# Patient Record
Sex: FEMALE | Race: ASIAN | NOT HISPANIC OR LATINO | Employment: FULL TIME | URBAN - METROPOLITAN AREA
[De-identification: names, ages, dates, MRNs, and addresses within clinical notes are randomized per-mention and may not be internally consistent; named-entity substitution may affect disease eponyms.]

---

## 2017-01-03 ENCOUNTER — RADIANT APPOINTMENT (OUTPATIENT)
Dept: GENERAL RADIOLOGY | Facility: CLINIC | Age: 57
End: 2017-01-03
Attending: INTERNAL MEDICINE
Payer: COMMERCIAL

## 2017-01-03 ENCOUNTER — OFFICE VISIT (OUTPATIENT)
Dept: INTERNAL MEDICINE | Facility: CLINIC | Age: 57
End: 2017-01-03
Payer: COMMERCIAL

## 2017-01-03 ENCOUNTER — TELEPHONE (OUTPATIENT)
Dept: INTERNAL MEDICINE | Facility: CLINIC | Age: 57
End: 2017-01-03

## 2017-01-03 VITALS
SYSTOLIC BLOOD PRESSURE: 132 MMHG | BODY MASS INDEX: 22.1 KG/M2 | HEIGHT: 62 IN | HEART RATE: 85 BPM | OXYGEN SATURATION: 98 % | DIASTOLIC BLOOD PRESSURE: 82 MMHG | TEMPERATURE: 97.9 F | WEIGHT: 120.1 LBS

## 2017-01-03 DIAGNOSIS — C33 MALIGNANT NEOPLASM OF TRACHEA, BRONCHUS, AND LUNG (H): ICD-10-CM

## 2017-01-03 DIAGNOSIS — A60.00 RECURRENT GENITAL HSV (HERPES SIMPLEX VIRUS) INFECTION: ICD-10-CM

## 2017-01-03 DIAGNOSIS — C34.80 MALIGNANT NEOPLASM OF TRACHEA, BRONCHUS, AND LUNG (H): ICD-10-CM

## 2017-01-03 DIAGNOSIS — A60.04 HERPES SIMPLEX VULVOVAGINITIS: ICD-10-CM

## 2017-01-03 DIAGNOSIS — E05.90 HYPERTHYROIDISM: ICD-10-CM

## 2017-01-03 DIAGNOSIS — R05.8 NOCTURNAL COUGH: ICD-10-CM

## 2017-01-03 DIAGNOSIS — R06.09 DOE (DYSPNEA ON EXERTION): ICD-10-CM

## 2017-01-03 DIAGNOSIS — R06.09 DOE (DYSPNEA ON EXERTION): Primary | ICD-10-CM

## 2017-01-03 DIAGNOSIS — R53.83 FATIGUE, UNSPECIFIED TYPE: ICD-10-CM

## 2017-01-03 LAB
ALBUMIN SERPL-MCNC: 3.8 G/DL (ref 3.4–5)
ALP SERPL-CCNC: 84 U/L (ref 40–150)
ALT SERPL W P-5'-P-CCNC: 30 U/L (ref 0–50)
ANION GAP SERPL CALCULATED.3IONS-SCNC: 6 MMOL/L (ref 3–14)
AST SERPL W P-5'-P-CCNC: 18 U/L (ref 0–45)
BASOPHILS # BLD AUTO: 0 10E9/L (ref 0–0.2)
BASOPHILS NFR BLD AUTO: 0.5 %
BILIRUB SERPL-MCNC: 0.3 MG/DL (ref 0.2–1.3)
BUN SERPL-MCNC: 14 MG/DL (ref 7–30)
CALCIUM SERPL-MCNC: 10 MG/DL (ref 8.5–10.1)
CHLORIDE SERPL-SCNC: 106 MMOL/L (ref 94–109)
CO2 SERPL-SCNC: 29 MMOL/L (ref 20–32)
CREAT SERPL-MCNC: 0.75 MG/DL (ref 0.52–1.04)
DIFFERENTIAL METHOD BLD: ABNORMAL
EOSINOPHIL # BLD AUTO: 0.2 10E9/L (ref 0–0.7)
EOSINOPHIL NFR BLD AUTO: 2.3 %
ERYTHROCYTE [DISTWIDTH] IN BLOOD BY AUTOMATED COUNT: 13.8 % (ref 10–15)
FEF 25/75: NORMAL
FEV-1: NORMAL
FEV1/FVC: NORMAL
FVC: NORMAL
GFR SERPL CREATININE-BSD FRML MDRD: 80 ML/MIN/1.7M2
GLUCOSE SERPL-MCNC: 97 MG/DL (ref 70–99)
HCT VFR BLD AUTO: 40 % (ref 35–47)
HGB BLD-MCNC: 13 G/DL (ref 11.7–15.7)
LYMPHOCYTES # BLD AUTO: 2.6 10E9/L (ref 0.8–5.3)
LYMPHOCYTES NFR BLD AUTO: 32.9 %
MCH RBC QN AUTO: 24.4 PG (ref 26.5–33)
MCHC RBC AUTO-ENTMCNC: 32.5 G/DL (ref 31.5–36.5)
MCV RBC AUTO: 75 FL (ref 78–100)
MONOCYTES # BLD AUTO: 0.6 10E9/L (ref 0–1.3)
MONOCYTES NFR BLD AUTO: 7.1 %
NEUTROPHILS # BLD AUTO: 4.5 10E9/L (ref 1.6–8.3)
NEUTROPHILS NFR BLD AUTO: 57.2 %
PLATELET # BLD AUTO: 352 10E9/L (ref 150–450)
POTASSIUM SERPL-SCNC: 4.4 MMOL/L (ref 3.4–5.3)
PROT SERPL-MCNC: 7.6 G/DL (ref 6.8–8.8)
RBC # BLD AUTO: 5.32 10E12/L (ref 3.8–5.2)
SODIUM SERPL-SCNC: 141 MMOL/L (ref 133–144)
T4 FREE SERPL-MCNC: 0.95 NG/DL (ref 0.76–1.46)
TSH SERPL DL<=0.05 MIU/L-ACNC: 3.07 MU/L (ref 0.4–4)
WBC # BLD AUTO: 7.8 10E9/L (ref 4–11)

## 2017-01-03 PROCEDURE — 36415 COLL VENOUS BLD VENIPUNCTURE: CPT | Performed by: INTERNAL MEDICINE

## 2017-01-03 PROCEDURE — 84439 ASSAY OF FREE THYROXINE: CPT | Performed by: INTERNAL MEDICINE

## 2017-01-03 PROCEDURE — 94010 BREATHING CAPACITY TEST: CPT | Performed by: INTERNAL MEDICINE

## 2017-01-03 PROCEDURE — 71020 XR CHEST 2 VW: CPT

## 2017-01-03 PROCEDURE — 99214 OFFICE O/P EST MOD 30 MIN: CPT | Mod: 25 | Performed by: INTERNAL MEDICINE

## 2017-01-03 PROCEDURE — 93000 ELECTROCARDIOGRAM COMPLETE: CPT | Performed by: INTERNAL MEDICINE

## 2017-01-03 PROCEDURE — 80050 GENERAL HEALTH PANEL: CPT | Performed by: INTERNAL MEDICINE

## 2017-01-03 RX ORDER — VALACYCLOVIR HYDROCHLORIDE 500 MG/1
500 TABLET, FILM COATED ORAL DAILY
Qty: 90 TABLET | Refills: 3 | Status: SHIPPED | OUTPATIENT
Start: 2017-01-03 | End: 2018-04-04

## 2017-01-03 RX ORDER — ALBUTEROL SULFATE 90 UG/1
2 AEROSOL, METERED RESPIRATORY (INHALATION) EVERY 6 HOURS PRN
Qty: 1 INHALER | Refills: 5 | Status: SHIPPED | OUTPATIENT
Start: 2017-01-03 | End: 2018-04-04

## 2017-01-03 NOTE — PATIENT INSTRUCTIONS
*  Echocardiogram to check heart function.  You should receive a call the schedule this procedure at [facility].      *  Labs today to recheck thyroid and other causes for fatigue.     *  The breathing teste indicated that there may be some form of asthma that may be cause your breathign issues.     *  Use the albuterol inhaler 2 puffs as needed for any shortness of breath , wheezing ro coughing.     *  Consider using the Albuterol inhaler 2 puffs BEFORE ANY PHYSICAL ACTIVITY to see if this helps the performance and breathing.     *  Return to see me in 1 month regardless of how you feel, return sooner if needed.  Call [phone] to schedule this appointment.

## 2017-01-03 NOTE — PROGRESS NOTES
SUBJECTIVE:                                                    Kimberly Larkin is a 56 year old female who presents to clinic today for the following health issues:      Vaginal Symptoms     Onset: 2-3 months   C/o more frequent herpes outbreaks. Pt normally gets 6-7 outbreaks in one year, has had 3 last month alone.     Therapies Tried and outcome: valcyclovir helps but takes a while to work    Concern - Breathing Porblem     Onset: 6 months     Description:   Productive cough, sometimes sees chunks, very smelly sputum, heaviness in chest, it takes a lot of energy for pt to try to take deep breath, is fatigued easily    Intensity: moderate    Progression of Symptoms:  worsening and constant    Accompanying Signs & Symptoms:  Productive cough, worse at night, has trouble sleeping       Previous history of similar problem:   Has hx of lung cancer    Precipitating factors:   Worsened by: activity    Alleviating factors:  Improved by: temporary relief from albuterol inhaler       Therapies Tried and outcome: emporary relief from albuterol inhaler          Problem list and histories reviewed & adjusted, as indicated.  Additional history: as documented      Past Medical History:  ---------------------------  Past Medical History   Diagnosis Date     Malignant neoplasm of bronchus and lung, unspecified site 3/30/07     Squamous cell carcinoma, left lower lobe lung, surgical stage T2N0M0     Helicobacter pylori gastritis 14     per EGD biopsy; treated with 14 days triple therapy by GI     Hyperthyroidism        Past Surgical History:  ---------------------------  Past Surgical History   Procedure Laterality Date     C/section, low transverse       , Low Transverse x 2     Hysterectomy, leydi       LEYDI/BSO for myoma     C thoracoscopy surg lobectomy  3/30/07     Squamous cell carcinoma, left lower lobe lung, surgical stage T2N0M0     Colonoscopy  11     normal colonoscopy     Cholecystectomy,  laporoscopic  6/21/11     Cholecystectomy, Laparoscopic with intraoperative cholangiogram     Hysterectomy, pap no longer indicated  2006     LEYDI/BSO for myoma       Current Medications:  ---------------------------  Current Outpatient Prescriptions   Medication Sig Dispense Refill     valACYclovir (VALTREX) 500 MG tablet Take 1 tablet (500 mg) by mouth daily 90 tablet 3     albuterol (PROAIR HFA/PROVENTIL HFA/VENTOLIN HFA) 108 (90 BASE) MCG/ACT Inhaler Inhale 2 puffs into the lungs every 6 hours as needed for shortness of breath / dyspnea or wheezing 1 Inhaler 5     celecoxib (CELEBREX) 200 MG capsule Take 1 capsule (200 mg) by mouth 2 times daily as needed for moderate pain 60 capsule 0     estradiol (ESTRACE) 0.5 MG tablet Take 1 tablet (0.5 mg) by mouth every other day 90 tablet 0     valACYclovir (VALTREX) 500 MG tablet Take 1 tablet (500 mg) by mouth 2 times daily 6 tablet 3     fluticasone (FLONASE) 50 MCG/ACT nasal spray Spray 1-2 sprays into both nostrils daily 16 g 2     methimazole (TAPAZOLE) 5 MG tablet Take 1 tablet (5 mg) by mouth daily       albuterol (PROAIR HFA, PROVENTIL HFA, VENTOLIN HFA) 108 (90 BASE) MCG/ACT inhaler Inhale 2 puffs into the lungs every 6 hours as needed for shortness of breath / dyspnea or wheezing 1 Inhaler 1     CALCIUM 600 + D 600-200 MG-UNIT OR TABS takes one per day 3 MONTHS 1 YEAR     MULTIVITAMINS OR TABS ONE DAILY 100 1 YEAR       Allergies:  -------------  Allergies   Allergen Reactions     Seasonal Allergies      Sneezing, sinus congestion, runny nose       Social History:  -------------------  Social History     Social History     Marital Status: Single     Spouse Name: N/A     Number of Children: N/A     Years of Education: N/A     Occupational History     Not on file.     Social History Main Topics     Smoking status: Never Smoker      Smokeless tobacco: Never Used     Alcohol Use: No     Drug Use: No     Sexual Activity:     Partners: Male      Comment: Hysterectomy  "    Other Topics Concern     Not on file     Social History Narrative       Family Medical History:  ------------------------------  Family History   Problem Relation Age of Onset     Hypertension Father      CEREBROVASCULAR DISEASE Father      C.A.D. Father      Eye Disorder Father      cataract     DIABETES Mother      Family History Negative Brother      Family History Negative Brother      Family History Negative Brother      Family History Negative Sister      CANCER Brother       throat cancer age 36         ROS:  REVIEW OF SYSTEMS:    RESP: negative for  Hemoptysis; POS for occ nonproductive coughing, occ nocturnal cough, occ dyspnea on exertion   CV: negative for chest pain, palpitations, PND,orthopnea  GI: negative for dysphagia, N/V, pain, melena, diarrhea and constipation  NEURO: negative for numbness/tingling, paralysis, incoordination, or focal weakness     OBJECTIVE:                                                    /82 mmHg  Pulse 85  Temp(Src) 97.9  F (36.6  C) (Oral)  Ht 5' 2\" (1.575 m)  Wt 120 lb 1.6 oz (54.477 kg)  BMI 21.96 kg/m2  SpO2 98%  LMP 10/11/2006     GENERAL alert and no distress.  EYES conjunctivae/corneas clear. PERRL, EOM's intact  HENT: NCAT,oral and posterior pharynx without lesions or erythema, facies symmetric  NECK: Neck supple. No LAD, without thyroidmegaly or JVD.  RESP: Clear to ausculation bilaterally without wheezes or crackles. Normal BS in all fields.  CV: RRR normal S1S2 without  murmurs, rubs or gallops. PMI normal  LYMPH: no cervical lymph adenopathy appreciated  GI: NTND, no organomegaly, normal BS in all quadrants, without rebound or guarding  MS: No cyanosis, clubbing or edema noted bilaterally in Upper and/or Lower Extremities  SKIN: no significant ulcers, lesions or rashes on the visualized portions of the skin  NEURO: Alert and Oriented x 3, Gait normal. Reflexes normal and symmetric. Sensation grossly WNL..  PSYCH: Alert and oriented times 3; " speech- coherent , normal rate and volume; able to articulate logical thoughts, able to abstract reason, no tangential thoughts, no hallucinations or delusions, affect- normal     Spirometry:  Mild/moderate obstructive disease    EKG:  normal sinus rhythm, no ischemia     CXR:  No active diseae (my prelim read)    Results for orders placed or performed in visit on 01/03/17   Spirometry, Breathing Capacity: Normal Order, Clinic Performed   Result Value Ref Range    FEV-1      FVC      FEV1/FVC      FEF 25/75     CBC with platelets and differential   Result Value Ref Range    WBC 7.8 4.0 - 11.0 10e9/L    RBC Count 5.32 (H) 3.8 - 5.2 10e12/L    Hemoglobin 13.0 11.7 - 15.7 g/dL    Hematocrit 40.0 35.0 - 47.0 %    MCV 75 (L) 78 - 100 fl    MCH 24.4 (L) 26.5 - 33.0 pg    MCHC 32.5 31.5 - 36.5 g/dL    RDW 13.8 10.0 - 15.0 %    Platelet Count 352 150 - 450 10e9/L    Diff Method Automated Method     % Neutrophils 57.2 %    % Lymphocytes 32.9 %    % Monocytes 7.1 %    % Eosinophils 2.3 %    % Basophils 0.5 %    Absolute Neutrophil 4.5 1.6 - 8.3 10e9/L    Absolute Lymphocytes 2.6 0.8 - 5.3 10e9/L    Absolute Monocytes 0.6 0.0 - 1.3 10e9/L    Absolute Eosinophils 0.2 0.0 - 0.7 10e9/L    Absolute Basophils 0.0 0.0 - 0.2 10e9/L   Comprehensive metabolic panel (BMP + Alb, Alk Phos, ALT, AST, Total. Bili, TP)   Result Value Ref Range    Sodium 141 133 - 144 mmol/L    Potassium 4.4 3.4 - 5.3 mmol/L    Chloride 106 94 - 109 mmol/L    Carbon Dioxide 29 20 - 32 mmol/L    Anion Gap 6 3 - 14 mmol/L    Glucose 97 70 - 99 mg/dL    Urea Nitrogen 14 7 - 30 mg/dL    Creatinine 0.75 0.52 - 1.04 mg/dL    GFR Estimate 80 >60 mL/min/1.7m2    GFR Estimate If Black >90   GFR Calc   >60 mL/min/1.7m2    Calcium 10.0 8.5 - 10.1 mg/dL    Bilirubin Total 0.3 0.2 - 1.3 mg/dL    Albumin 3.8 3.4 - 5.0 g/dL    Protein Total 7.6 6.8 - 8.8 g/dL    Alkaline Phosphatase 84 40 - 150 U/L    ALT 30 0 - 50 U/L    AST 18 0 - 45 U/L   TSH   Result  Value Ref Range    TSH 3.07 0.40 - 4.00 mU/L   T4, free   Result Value Ref Range    T4 Free 0.95 0.76 - 1.46 ng/dL         ASSESSMENT/PLAN:                                                      (R06.09) PLATA (dyspnea on exertion)  (primary encounter diagnosis)  Comment: multifactorail.   No clearly cardiac cause, but will check ECHO.   Could be reactive airway problem  Trial albuterol, especailly before any physical activity.   Her spirometry volumes may be misleading since she has has had partial lung resection as part ofher lung caner treatment.   Plan: Spirometry, Breathing Capacity: Normal Order,         Clinic Performed, Echocardiogram Complete, EKG         12-lead complete w/read - Clinics, albuterol         (PROAIR HFA/PROVENTIL HFA/VENTOLIN HFA) 108 (90        BASE) MCG/ACT Inhaler, XR Chest 2 Views            (A60.04) Herpes simplex vulvovaginitis  Comment: discussed genital herpes at length.   recommedned that she take daily supressive dose if she has been truly having frequent outbreaks.   Plan: valACYclovir (VALTREX) 500 MG tablet            (A60.00) Recurrent genital HSV (herpes simplex virus) infection  Comment:   Plan: valACYclovir (VALTREX) 500 MG tablet            (R53.83) Fatigue, unspecified type  Comment:   Plan: CBC with platelets and differential,         Comprehensive metabolic panel (BMP + Alb, Alk         Phos, ALT, AST, Total. Bili, TP), TSH, T4, free            (E05.90) Hyperthyroidism  Comment: Discussed signs and symptoms of hypo and hyperthyroidism.  Reviewed treatment options.   Recommended absolute medication compliance to avoid adding any additionial variance to the labs.   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  You should receive a call the schedule this procedure at [facility].      *  Labs today to recheck thyroid and other causes for fatigue.     *  The breathing teste indicated that there may be some form of asthma that may be cause your breathign issues.     *  Use the albuterol inhaler 2 puffs as needed for any shortness of breath , wheezing ro coughing.     *  Consider using the Albuterol inhaler 2 puffs BEFORE ANY PHYSICAL ACTIVITY to see if this helps the performance and breathing.     *  Return to see me in 1 month regardless of how you feel, return sooner if needed.  Call [phone] to schedule this appointment.            TY BAILEY M.D., MD  [facility]

## 2017-01-03 NOTE — NURSING NOTE
"Chief Complaint   Patient presents with     STD     herpes outbreaks have been occuring more frequently over the past couple months.     Breathing Problem     c/o \"heavy chest\", difficult to take deep breath X 6 months       Initial /82 mmHg  Pulse 85  Temp(Src) 97.9  F (36.6  C) (Oral)  Ht 5' 2\" (1.575 m)  Wt 120 lb 1.6 oz (54.477 kg)  BMI 21.96 kg/m2  SpO2 98%  LMP 10/11/2006 Estimated body mass index is 21.96 kg/(m^2) as calculated from the following:    Height as of this encounter: 5' 2\" (1.575 m).    Weight as of this encounter: 120 lb 1.6 oz (54.477 kg).  BP completed using cuff size: bozena Bhatia CMA      "

## 2017-01-03 NOTE — TELEPHONE ENCOUNTER
Please call the patient.      1.  Her blood tests were all within normal limits, including thyroid, kidney, liver, and blood counts.      2.  The Chest xray was read as negative by the radiologist.        Get the echocardiogram done at her convenience.   Use the Albuterol inhaler before she does any physical activity.      Return to see me in 1 month as planned.

## 2017-01-03 NOTE — MR AVS SNAPSHOT
After Visit Summary   1/3/2017    Kimberly Larkin    MRN: [id number]           Patient Information     Date Of Birth          1960        Visit Information        Provider Department      1/3/2017 10:20 AM Gilberto Clifford MD [facility]        Today's Diagnoses     PLATA (dyspnea on exertion)    -  1     Herpes simplex vulvovaginitis         Recurrent genital HSV (herpes simplex virus) infection         Fatigue, unspecified type         Hyperthyroidism         Nocturnal cough           Care Instructions    *  Echocardiogram to check heart function.  You should receive a call the schedule this procedure at [facility].      *  Labs today to recheck thyroid and other causes for fatigue.     *  The breathing teste indicated that there may be some form of asthma that may be cause your breathign issues.     *  Use the albuterol inhaler 2 puffs as needed for any shortness of breath , wheezing ro coughing.     *  Consider using the Albuterol inhaler 2 puffs BEFORE ANY PHYSICAL ACTIVITY to see if this helps the performance and breathing.     *  Return to see me in 1 month regardless of how you feel, return sooner if needed.  Call [phone] to schedule this appointment.              Follow-ups after your visit        Future tests that were ordered for you today     Open Future Orders        Priority Expected Expires Ordered    Echocardiogram Complete Routine  1/3/2018 1/3/2017            Who to contact     If you have questions or need follow up information about today's clinic visit or your schedule please contact [facility] directly at [phone].  Normal or non-critical lab and imaging results will be communicated to you by MyChart, letter or phone within 4 business days after the clinic has received the results. If you do not hear from us within 7 days, please contact the clinic through MyChart or phone. If you have a  "critical or abnormal lab result, we will notify you by phone as soon as possible.  Submit refill requests through [employer] or call your pharmacy and they will forward the refill request to us. Please allow 3 business days for your refill to be completed.          Additional Information About Your Visit        [place] Information     [employer] lets you send messages to your doctor, view your test results, renew your prescriptions, schedule appointments and more. To sign up, go to [email or web address] . Click on \"Log in\" on the left side of the screen, which will take you to the Welcome page. Then click on \"Sign up Now\" on the right side of the page.     You will be asked to enter the access code listed below, as well as some personal information. Please follow the directions to create your username and password.     Your access code is: [email or web address]  Expires: 4/3/2017 12:18 PM     Your access code will  in 90 days. If you need help or a new code, please call your [facility] clinic or [phone].        Care EveryWhere ID     This is your Care EveryWhere ID. This could be used by other organizations to access your [facility] medical records  [id number]        Your Vitals Were     Pulse Temperature Height BMI (Body Mass Index) Pulse Oximetry Last Period    85 97.9  F (36.6  C) (Oral) 5' 2\" (1.575 m) 21.96 kg/m2 98% 10/11/2006       Blood Pressure from Last 3 Encounters:   17 132/82   16 108/80   16 122/74    Weight from Last 3 Encounters:   17 120 lb 1.6 oz (54.477 kg)   16 117 lb (53.071 kg)   16 121 lb (54.885 kg)              We Performed the Following     CBC with platelets and differential     Comprehensive metabolic panel (BMP + Alb, Alk Phos, ALT, AST, Total. Bili, TP)     EKG 12-lead complete w/read - Clinics     Spirometry, Breathing Capacity: Normal Order, Clinic Performed     T4, free     TSH          Today's Medication Changes          These changes are accurate as " of: 1/3/17 12:18 PM.  If you have any questions, ask your nurse or doctor.               These medicines have changed or have updated prescriptions.        Dose/Directions    * albuterol 108 (90 BASE) MCG/ACT Inhaler   Commonly known as:  PROAIR HFA/PROVENTIL HFA/VENTOLIN HFA   This may have changed:  Another medication with the same name was added. Make sure you understand how and when to take each.   Used for:  Nocturnal cough   Changed by:  Gilberto Clifford MD        Dose:  2 puff   Inhale 2 puffs into the lungs every 6 hours as needed for shortness of breath / dyspnea or wheezing   Quantity:  1 Inhaler   Refills:  1       * albuterol 108 (90 BASE) MCG/ACT Inhaler   Commonly known as:  PROAIR HFA/PROVENTIL HFA/VENTOLIN HFA   This may have changed:  You were already taking a medication with the same name, and this prescription was added. Make sure you understand how and when to take each.   Used for:  PLATA (dyspnea on exertion), Nocturnal cough   Changed by:  Gilberto Clifford MD        Dose:  2 puff   Inhale 2 puffs into the lungs every 6 hours as needed for shortness of breath / dyspnea or wheezing   Quantity:  1 Inhaler   Refills:  5       * valACYclovir 500 MG tablet   Commonly known as:  VALTREX   This may have changed:  Another medication with the same name was added. Make sure you understand how and when to take each.   Used for:  Herpes simplex vulvovaginitis   Changed by:  Elvira Johnson PA-C        Dose:  500 mg   Take 1 tablet (500 mg) by mouth 2 times daily   Quantity:  6 tablet   Refills:  3       * valACYclovir 500 MG tablet   Commonly known as:  VALTREX   This may have changed:  You were already taking a medication with the same name, and this prescription was added. Make sure you understand how and when to take each.   Used for:  Herpes simplex vulvovaginitis, Recurrent genital HSV (herpes simplex virus) infection   Changed by:  Gilberto Clifford MD        Dose:  500  mg   Take 1 tablet (500 mg) by mouth daily   Quantity:  90 tablet   Refills:  3       * Notice:  This list has 4 medication(s) that are the same as other medications prescribed for you. Read the directions carefully, and ask your doctor or other care provider to review them with you.         Where to get your medicines      These medications were sent to [id number] - [place] - [address]  [address] [place] [address]     Phone:  [phone]    - albuterol 108 (90 BASE) MCG/ACT Inhaler  - valACYclovir 500 MG tablet             Primary Care Provider Office Phone # Fax #    Gilberto Clifford [phone]       [facility] [address] [facility] [address]        Thank you!     Thank you for choosing [facility]  for your care. Our goal is always to provide you with excellent care. Hearing back from our patients is one way we can continue to improve our services. Please take a few minutes to complete the written survey that you may receive in the mail after your visit with us. Thank you!             Your Updated Medication List - Protect others around you: Learn how to safely use, store and throw away your medicines at www.disposemymeds.org.          This list is accurate as of: 1/3/17 12:18 PM.  Always use your most recent med list.                   Brand Name Dispense Instructions for use    * albuterol 108 (90 BASE) MCG/ACT Inhaler    PROAIR HFA/PROVENTIL HFA/VENTOLIN HFA    1 Inhaler    Inhale 2 puffs into the lungs every 6 hours as needed for shortness of breath / dyspnea or wheezing       * albuterol 108 (90 BASE) MCG/ACT Inhaler    PROAIR HFA/PROVENTIL HFA/VENTOLIN HFA    1 Inhaler    Inhale 2 puffs into the lungs every 6 hours as needed for shortness of breath / dyspnea or wheezing       CALCIUM 600 + D 600-200 MG-UNIT Tabs     3 MONTHS    takes one per day       celecoxib 200 MG  capsule    celeBREX    60 capsule    Take 1 capsule (200 mg) by mouth 2 times daily as needed for moderate pain       estradiol 0.5 MG tablet    ESTRACE    90 tablet    Take 1 tablet (0.5 mg) by mouth every other day       fluticasone 50 MCG/ACT spray    FLONASE    16 g    Spray 1-2 sprays into both nostrils daily       methimazole 5 MG tablet    TAPAZOLE     Take 1 tablet (5 mg) by mouth daily       multivitamin per tablet     100    ONE DAILY       * valACYclovir 500 MG tablet    VALTREX    6 tablet    Take 1 tablet (500 mg) by mouth 2 times daily       * valACYclovir 500 MG tablet    VALTREX    90 tablet    Take 1 tablet (500 mg) by mouth daily       * Notice:  This list has 4 medication(s) that are the same as other medications prescribed for you. Read the directions carefully, and ask your doctor or other care provider to review them with you.

## 2017-01-10 ENCOUNTER — HOSPITAL ENCOUNTER (OUTPATIENT)
Dept: CARDIOLOGY | Facility: CLINIC | Age: 57
Discharge: HOME | End: 2017-01-10
Attending: INTERNAL MEDICINE | Admitting: INTERNAL MEDICINE
Payer: COMMERCIAL

## 2017-01-10 ENCOUNTER — TELEPHONE (OUTPATIENT)
Dept: INTERNAL MEDICINE | Facility: CLINIC | Age: 57
End: 2017-01-10

## 2017-01-10 DIAGNOSIS — R06.09 DOE (DYSPNEA ON EXERTION): ICD-10-CM

## 2017-01-10 PROCEDURE — 93306 TTE W/DOPPLER COMPLETE: CPT

## 2017-01-10 PROCEDURE — 93306 TTE W/DOPPLER COMPLETE: CPT | Mod: 26 | Performed by: INTERNAL MEDICINE

## 2017-01-10 NOTE — TELEPHONE ENCOUNTER
Please call the patient.      Her echocardiogram showed normal heart pumping function, with no problems in the valves or heart muscle.  There was no change when compared with the echo from 2011.   This makes me wonder if her recent shortness of breath is more due to airway disease.    Use the albuterol inhaler more often, especially before activity, as I recommended to her.     Return to see me in about 4-6 weeks, regardless of how they feel so we can recheck her symptoms, return sooner if needed.  Call [phone] to schedule this appointment.

## 2017-02-13 DIAGNOSIS — M53.3 SACROILIAC PAIN: ICD-10-CM

## 2017-02-13 NOTE — TELEPHONE ENCOUNTER
celecoxib (CELEBREX) 200 MG capsule      Last Written Prescription Date: 1960  Last Quantity: 60, # refills: 0  Last Office Visit with [facility] or [facility] prescribing provider: 01/03/2017       Creatinine   Date Value Ref Range Status   01/03/2017 0.75 0.52 - 1.04 mg/dL Final     Lab Results   Component Value Date    AST 18 01/03/2017     Lab Results   Component Value Date    ALT 30 01/03/2017     BP Readings from Last 3 Encounters:   01/03/17 132/82   05/23/16 108/80   03/29/16 122/74

## 2017-02-14 RX ORDER — CELECOXIB 200 MG/1
200 CAPSULE ORAL 2 TIMES DAILY PRN
Qty: 60 CAPSULE | Refills: 10 | Status: SHIPPED | OUTPATIENT
Start: 2017-02-14 | End: 2018-06-01

## 2017-09-07 ENCOUNTER — TELEPHONE (OUTPATIENT)
Dept: INTERNAL MEDICINE | Facility: CLINIC | Age: 57
End: 2017-09-07

## 2017-09-07 ENCOUNTER — OFFICE VISIT (OUTPATIENT)
Dept: INTERNAL MEDICINE | Facility: CLINIC | Age: 57
End: 2017-09-07
Payer: COMMERCIAL

## 2017-09-07 VITALS
BODY MASS INDEX: 22.06 KG/M2 | TEMPERATURE: 98.1 F | SYSTOLIC BLOOD PRESSURE: 122 MMHG | WEIGHT: 120.6 LBS | DIASTOLIC BLOOD PRESSURE: 76 MMHG | OXYGEN SATURATION: 98 % | HEART RATE: 90 BPM

## 2017-09-07 DIAGNOSIS — M53.3 SACROILIAC JOINT PAIN: Primary | ICD-10-CM

## 2017-09-07 DIAGNOSIS — Z85.118 HISTORY OF LUNG CANCER: ICD-10-CM

## 2017-09-07 DIAGNOSIS — M54.50 CHRONIC BILATERAL LOW BACK PAIN WITHOUT SCIATICA: ICD-10-CM

## 2017-09-07 DIAGNOSIS — R06.09 DOE (DYSPNEA ON EXERTION): ICD-10-CM

## 2017-09-07 DIAGNOSIS — R05.8 NOCTURNAL COUGH: ICD-10-CM

## 2017-09-07 DIAGNOSIS — J45.30 MILD PERSISTENT ASTHMA WITHOUT COMPLICATION: Primary | ICD-10-CM

## 2017-09-07 DIAGNOSIS — G89.29 CHRONIC BILATERAL LOW BACK PAIN WITHOUT SCIATICA: ICD-10-CM

## 2017-09-07 PROCEDURE — 99213 OFFICE O/P EST LOW 20 MIN: CPT | Performed by: INTERNAL MEDICINE

## 2017-09-07 NOTE — MR AVS SNAPSHOT
"              After Visit Summary   9/7/2017    Kimberly Larkin    MRN: [id number]           Patient Information     Date Of Birth          1960        Visit Information        Provider Department      9/7/2017 8:20 AM Gilberto Clifford MD [facility]        Today's Diagnoses     Sacroiliac joint pain    -  1    PLATA (dyspnea on exertion)        Nocturnal cough        Chronic bilateral low back pain without sciatica        History of lung cancer          Care Instructions    *  MRI of lumbar spine and scaroiliac joints.  I will contact you about the results.     *  Continue Celebrex twice per day for anti inflammatory relief.    *  See Physiatrist (nonosurigcal management of back, joint, muscle pains).     --Dr. James Costa  [phone],    --Dr. Alirio Avila ([facility])  [phone]    *  Inhaled steroid medication for lungs to reduce inflammation inside airways:     --beclamethasone inhaler (Qvar):   2 puffs twice per day, every day to help control and prevent the inflammation in the airways. Fluticasone is not a \"rescue inhaler\", you should not use this inhaler for urgent breathing problem, use the Albuterol inhaler.    *  Use albuterol inhaler as needed.     *  Return to see me in 1 month regardless of how you feel, return sooner if needed.  Call [phone] to schedule this appointment.        *  Use steroid nasal spray (available over the counter)   --typical brands include Flonase (fluticasone) or Nasonex (mometasone) or Nasocort (triamcinolone)    Examples of steroid nasal spray:              --Use 2 sprays into each nostril once per day, every day regardless of how you feel for the next 4-8 weeks, depending on the length of the allergy season.  This type of steroid nasal spray will take at least 10 days to reach full effect, please use it for at least 3 full weeks before deciding if it doesn't work for you.                      Follow-ups after your visit   "      Additional Services     PHYSIATRY REFERRAL       Your provider has referred you to:     --James Costa MD - [place] [phone]   [email or web address]    --[facility] - [place] [phone]   [email or web address]    Please be aware that coverage of these services is subject to the terms and limitations of your health insurance plan.  Call member services at your health plan with any benefit or coverage questions.      Please bring the following to your appointment:  >>   Any x-rays, CTs or MRIs which have been performed.  Contact the facility where they were done to arrange for  prior to your scheduled appointment.    >>   List of current medications   >>   This referral request   >>   Any documents/labs given to you for this referral                  Future tests that were ordered for you today     Open Future Orders        Priority Expected Expires Ordered    MR Lumbar Spine w/o Contrast Routine  9/7/2018 9/7/2017    MR Sacrum and Coccyx w/o Contrast Routine  9/7/2018 9/7/2017            Who to contact     If you have questions or need follow up information about today's clinic visit or your schedule please contact [facility] directly at [phone].  Normal or non-critical lab and imaging results will be communicated to you by Tapactivehart, letter or phone within 4 business days after the clinic has received the results. If you do not hear from us within 7 days, please contact the clinic through Tapactivehart or phone. If you have a critical or abnormal lab result, we will notify you by phone as soon as possible.  Submit refill requests through mobintent or call your pharmacy and they will forward the refill request to us. Please allow 3 business days for your refill to be completed.          Additional Information About Your Visit        mobintent Information     mobintent lets you send messages to your doctor, view your test results,  "renew your prescriptions, schedule appointments and more. To sign up, go to [email or web address] . Click on \"Log in\" on the left side of the screen, which will take you to the Welcome page. Then click on \"Sign up Now\" on the right side of the page.     You will be asked to enter the access code listed below, as well as some personal information. Please follow the directions to create your username and password.     Your access code is: [email or web address]  Expires: 2017  9:27 AM     Your access code will  in 90 days. If you need help or a new code, please call your [place] clinic or [phone].        Care EveryWhere ID     This is your Care EveryWhere ID. This could be used by other organizations to access your [place] medical records  [id number]        Your Vitals Were     Pulse Temperature Last Period Pulse Oximetry BMI (Body Mass Index)       90 98.1  F (36.7  C) (Oral) 10/11/2006 98% 22.06 kg/m2        Blood Pressure from Last 3 Encounters:   17 122/76   17 132/82   16 108/80    Weight from Last 3 Encounters:   17 120 lb 9.6 oz (54.7 kg)   17 120 lb 1.6 oz (54.5 kg)   16 117 lb (53.1 kg)              We Performed the Following     PHYSIATRY REFERRAL          Today's Medication Changes          These changes are accurate as of: 17  9:28 AM.  If you have any questions, ask your nurse or doctor.               Start taking these medicines.        Dose/Directions    beclomethasone 80 MCG/ACT Inhaler   Commonly known as:  QVAR   Used for:  Nocturnal cough   Started by:  Gilberto Clifford MD        Dose:  2 puff   Inhale 2 puffs into the lungs 2 times daily   Quantity:  1 Inhaler   Refills:  2            Where to get your medicines      These medications were sent to [facility] [address] - [address] & [place] [address] [place] [address]     Phone:  [phone]     beclomethasone 80 MCG/ACT Inhaler "                Primary Care Provider Office Phone # Fax #    Gilberto Clifford [phone]       [address] [facility] [address]        Equal Access to Services     MARYAM VILLASENOR : Leslye stan levine leslye Schafer, waroeda luqadaha, qaybta kaalmada zaid, carloz garcia laAngelesjese delong. So [facility] [phone].    ATENCIÓN: Si habla español, tiene a richter disposición servicios gratuitos de asistencia lingüística. Llame al [phone].    We comply with applicable federal civil rights laws and [place] laws. We do not discriminate on the basis of race, color, national origin, age, disability sex, sexual orientation or gender identity.            Thank you!     Thank you for choosing [facility]  for your care. Our goal is always to provide you with excellent care. Hearing back from our patients is one way we can continue to improve our services. Please take a few minutes to complete the written survey that you may receive in the mail after your visit with us. Thank you!             Your Updated Medication List - Protect others around you: Learn how to safely use, store and throw away your medicines at www.disposemymeds.org.          This list is accurate as of: 9/7/17  9:28 AM.  Always use your most recent med list.                   Brand Name Dispense Instructions for use Diagnosis    albuterol 108 (90 BASE) MCG/ACT Inhaler    PROAIR HFA/PROVENTIL HFA/VENTOLIN HFA    1 Inhaler    Inhale 2 puffs into the lungs every 6 hours as needed for shortness of breath / dyspnea or wheezing    PLATA (dyspnea on exertion), Nocturnal cough       beclomethasone 80 MCG/ACT Inhaler    QVAR    1 Inhaler    Inhale 2 puffs into the lungs 2 times daily    Nocturnal cough       CALCIUM 600 + D 600-200 MG-UNIT Tabs     3 MONTHS    takes one per day        celecoxib 200 MG capsule    celeBREX    60 capsule    Take 1 capsule (200 mg) by mouth 2 times daily as needed for moderate pain     Sacroiliac pain       estradiol 0.5 MG tablet    ESTRACE    90 tablet    Take 1 tablet (0.5 mg) by mouth every other day    Need for prophylactic hormone replacement therapy (postmenopausal)       fluticasone 50 MCG/ACT spray    FLONASE    16 g    Spray 1-2 sprays into both nostrils daily    Acute sinusitis treated with antibiotics in the past 60 days       methimazole 5 MG tablet    TAPAZOLE     Take 1 tablet (5 mg) by mouth daily    Hyperthyroidism       multivitamin per tablet     100    ONE DAILY        valACYclovir 500 MG tablet    VALTREX    90 tablet    Take 1 tablet (500 mg) by mouth daily    Herpes simplex vulvovaginitis, Recurrent genital HSV (herpes simplex virus) infection

## 2017-09-07 NOTE — PROGRESS NOTES
SUBJECTIVE:   Kimberly Larkin is a 57 year old female who presents to clinic today for the following health issues:      Joint Pain    Onset: 6 months    Description:   Location: LBP  Character: pinching    Intensity: 8-9/10    Progression of Symptoms: constant, worse    Accompanying Signs & Symptoms:  Other symptoms: radiation of pain to legs sometimes    History:   Previous similar pain: no       Precipitating factors:   Trauma or overuse: YES- pt fell ice skating last yr and has hurt since accident abd has been getting worse over the past 6 months    Alleviating factors:  Improved by: nothing    Therapies Tried and outcome: stretching and exercises w/ out relief, celebrex w/ out relief              Problem list and histories reviewed & adjusted, as indicated.  Additional history: as documented        Reviewed and updated as needed this visit by clinical staffTobacco  Allergies       Reviewed and updated as needed this visit by Provider           Past Medical History:  ---------------------------  Past Medical History:   Diagnosis Date     Helicobacter pylori gastritis 14    per EGD biopsy; treated with 14 days triple therapy by GI     Hyperthyroidism      Malignant neoplasm of bronchus and lung, unspecified site 3/30/07    Squamous cell carcinoma, left lower lobe lung, surgical stage T2N0M0       Past Surgical History:  ---------------------------  Past Surgical History:   Procedure Laterality Date     C THORACOSCOPY SURG LOBECTOMY  3/30/07    Squamous cell carcinoma, left lower lobe lung, surgical stage T2N0M0     C/SECTION, LOW TRANSVERSE      , Low Transverse x 2     CHOLECYSTECTOMY, LAPOROSCOPIC  11    Cholecystectomy, Laparoscopic with intraoperative cholangiogram     COLONOSCOPY  11    normal colonoscopy     HYSTERECTOMY, PAP NO LONGER INDICATED      LEYDI/BSO for myoma     HYSTERECTOMY, LEYDI      LEYDI/BSO for myoma       Current  Medications:  ---------------------------  Current Outpatient Prescriptions   Medication Sig Dispense Refill     celecoxib (CELEBREX) 200 MG capsule Take 1 capsule (200 mg) by mouth 2 times daily as needed for moderate pain 60 capsule 10     valACYclovir (VALTREX) 500 MG tablet Take 1 tablet (500 mg) by mouth daily 90 tablet 3     albuterol (PROAIR HFA/PROVENTIL HFA/VENTOLIN HFA) 108 (90 BASE) MCG/ACT Inhaler Inhale 2 puffs into the lungs every 6 hours as needed for shortness of breath / dyspnea or wheezing 1 Inhaler 5     estradiol (ESTRACE) 0.5 MG tablet Take 1 tablet (0.5 mg) by mouth every other day 90 tablet 0     fluticasone (FLONASE) 50 MCG/ACT nasal spray Spray 1-2 sprays into both nostrils daily 16 g 2     CALCIUM 600 + D 600-200 MG-UNIT OR TABS takes one per day 3 MONTHS 1 YEAR     MULTIVITAMINS OR TABS ONE DAILY 100 1 YEAR     [DISCONTINUED] valACYclovir (VALTREX) 500 MG tablet Take 1 tablet (500 mg) by mouth 2 times daily (Patient not taking: Reported on 9/7/2017) 6 tablet 3     methimazole (TAPAZOLE) 5 MG tablet Take 1 tablet (5 mg) by mouth daily       [DISCONTINUED] albuterol (PROAIR HFA, PROVENTIL HFA, VENTOLIN HFA) 108 (90 BASE) MCG/ACT inhaler Inhale 2 puffs into the lungs every 6 hours as needed for shortness of breath / dyspnea or wheezing (Patient not taking: Reported on 9/7/2017) 1 Inhaler 1       Allergies:  -------------  Allergies   Allergen Reactions     Seasonal Allergies      Sneezing, sinus congestion, runny nose       Social History:  -------------------  Social History     Social History     Marital status: Single     Spouse name: N/A     Number of children: N/A     Years of education: N/A     Occupational History     Not on file.     Social History Main Topics     Smoking status: Never Smoker     Smokeless tobacco: Never Used     Alcohol use No     Drug use: No     Sexual activity: Not Currently     Partners: Male      Comment: Hysterectomy     Other Topics Concern     Not on file      Social History Narrative       Family Medical History:  ------------------------------  Family History   Problem Relation Age of Onset     Hypertension Father      CEREBROVASCULAR DISEASE Father      C.A.D. Father      Eye Disorder Father      cataract     DIABETES Mother      Family History Negative Brother      Family History Negative Brother      Family History Negative Brother      Family History Negative Sister      CANCER Brother       throat cancer age 36         ROS:  REVIEW OF SYSTEMS:    RESP: negative for cough, dyspnea, wheezing, hemoptysis  CV: negative for chest pain, palpitations, PND, PLATA, orthopnea; reports no changes in their ability to perform physical activity (from cardiovascular standpoint)  GI: negative for dysphagia, N/V, pain, melena, diarrhea and constipation  NEURO: negative for numbness/tingling, paralysis, incoordination, or focal weakness     OBJECTIVE:                                                    /76  Pulse 90  Temp 98.1  F (36.7  C) (Oral)  Wt 120 lb 9.6 oz (54.7 kg)  LMP 10/11/2006  SpO2 98%  BMI 22.06 kg/m2     GENERAL alert and no distress  EYES:  Normal sclera,conjunctiva, EOMI  HENT: oral and posterior pharynx without lesions or erythema, facies symmetric  NECK: Neck supple. No LAD, without thyroidmegaly or JVD., Carotids without bruits.  RESP: Clear to ausculation bilaterally without wheezes or crackles. Normal BS in all fields.  CV: RRR normal S1S2 without murmurs, rubs or gallops. PMI normal  LYMPH: no cervical lymph adenopathy appreciated  MS: extremities- no gross deformities of the visible extremities noted, no edema  PSYCH: Alert and oriented times 3; speech- coherent  SKIN:  No obvious significant skin lesions on visible portions of face   BACK:  Pt appears uncomfortable, but not in severe distress.  Pain to palpation of paraspinal lumbar muscles, muscles in spasm, palpation reproduces pain exactly. straight leg-raises negative bilaterally.  Able  "to move on and off the exam table, no obsevred weakness in the feet or legs with walking, standing, or ambulation. Normal reflexes in the achilles and patellar tendons.        ASSESSMENT/PLAN:                                                      (M53.3) Sacroiliac joint pain  (primary encounter diagnosis)  Comment:   Plan: PHYSIATRY REFERRAL, MR Lumbar Spine w/o         Contrast, MR Sacrum and Coccyx w/o Contrast,         CANCELED: XR Lumbar Spine 2/3 Views, CANCELED:         XR Sacroiliac Joint G/E 3 Views            (R06.09) PLATA (dyspnea on exertion)  Comment:   Plan:     (R05) Nocturnal cough  Comment:   Plan: DISCONTINUED: beclomethasone (QVAR) 80 MCG/ACT         Inhaler            (M54.5,  G89.29) Chronic bilateral low back pain without sciatica  Comment:   Plan: MR Lumbar Spine w/o Contrast, MR Sacrum and         Coccyx w/o Contrast            (Z85.118) History of lung cancer  Comment:   Plan:        *  MRI of lumbar spine and scaroiliac joints.  I will contact you about the results.     *  Continue Celebrex twice per day for anti inflammatory relief.    *  See Physiatrist (nonosurigcal management of back, joint, muscle pains).     --Dr. James Costa  [phone],    --Dr. Alirio Avila ([facility])  [phone]    *  Inhaled steroid medication for lungs to reduce inflammation inside airways:     --beclamethasone inhaler (Qvar):   2 puffs twice per day, every day to help control and prevent the inflammation in the airways. Fluticasone is not a \"rescue inhaler\", you should not use this inhaler for urgent breathing problem, use the Albuterol inhaler.    *  Use albuterol inhaler as needed.     *  Return to see me in 1 month regardless of how you feel, return sooner if needed.  Call [phone] to schedule this appointment.        *  Use steroid nasal spray (available over the counter)   --typical brands include Flonase (fluticasone) or Nasonex (mometasone) or Nasocort (triamcinolone)    Examples of " steroid nasal spray:              --Use 2 sprays into each nostril once per day, every day regardless of how you feel for the next 4-8 weeks, depending on the length of the allergy season.  This type of steroid nasal spray will take at least 10 days to reach full effect, please use it for at least 3 full weeks before deciding if it doesn't work for you.                  See Patient Instructions    TY BAILEY M.D., MD  [facility]

## 2017-09-07 NOTE — TELEPHONE ENCOUNTER
Prior authorization    Medication name qvar  Insurance [facility]  Insurance ID number [id number]  Prior authorization faxed through cover my meds

## 2017-09-07 NOTE — NURSING NOTE
"Chief Complaint   Patient presents with     Musculoskeletal Problem     LBP X 6 motnhs       Initial /76  Pulse 90  Temp 98.1  F (36.7  C) (Oral)  Wt 120 lb 9.6 oz (54.7 kg)  LMP 10/11/2006  SpO2 98%  BMI 22.06 kg/m2 Estimated body mass index is 22.06 kg/(m^2) as calculated from the following:    Height as of 1/3/17: 5' 2\" (1.575 m).    Weight as of this encounter: 120 lb 9.6 oz (54.7 kg).  Medication Reconciliation: complete   Nikia Bhatia, [place]      "

## 2017-09-07 NOTE — PATIENT INSTRUCTIONS
"*  MRI of lumbar spine and scaroiliac joints.  I will contact you about the results.     *  Continue Celebrex twice per day for anti inflammatory relief.    *  See Physiatrist (nonosurigcal management of back, joint, muscle pains).     --Dr. James Costa  [phone],    --Dr. Alirio Avila ([facility])  [phone]    *  Inhaled steroid medication for lungs to reduce inflammation inside airways:     --beclamethasone inhaler (Qvar):   2 puffs twice per day, every day to help control and prevent the inflammation in the airways. Fluticasone is not a \"rescue inhaler\", you should not use this inhaler for urgent breathing problem, use the Albuterol inhaler.    *  Use albuterol inhaler as needed.     *  Return to see me in 1 month regardless of how you feel, return sooner if needed.  Call [phone] to schedule this appointment.        *  Use steroid nasal spray (available over the counter)   --typical brands include Flonase (fluticasone) or Nasonex (mometasone) or Nasocort (triamcinolone)    Examples of steroid nasal spray:              --Use 2 sprays into each nostril once per day, every day regardless of how you feel for the next 4-8 weeks, depending on the length of the allergy season.  This type of steroid nasal spray will take at least 10 days to reach full effect, please use it for at least 3 full weeks before deciding if it doesn't work for you.              "

## 2017-09-13 ENCOUNTER — HOSPITAL ENCOUNTER (OUTPATIENT)
Dept: MRI IMAGING | Facility: CLINIC | Age: 57
Discharge: HOME | End: 2017-09-13
Attending: INTERNAL MEDICINE | Admitting: INTERNAL MEDICINE
Payer: COMMERCIAL

## 2017-09-13 ENCOUNTER — HOSPITAL ENCOUNTER (OUTPATIENT)
Dept: MRI IMAGING | Facility: CLINIC | Age: 57
End: 2017-09-13
Attending: INTERNAL MEDICINE
Payer: COMMERCIAL

## 2017-09-13 ENCOUNTER — TELEPHONE (OUTPATIENT)
Dept: INTERNAL MEDICINE | Facility: CLINIC | Age: 57
End: 2017-09-13

## 2017-09-13 DIAGNOSIS — G89.29 CHRONIC BILATERAL LOW BACK PAIN WITHOUT SCIATICA: ICD-10-CM

## 2017-09-13 DIAGNOSIS — M53.3 SACROILIAC JOINT PAIN: ICD-10-CM

## 2017-09-13 DIAGNOSIS — M54.50 CHRONIC BILATERAL LOW BACK PAIN WITHOUT SCIATICA: ICD-10-CM

## 2017-09-13 PROCEDURE — 72148 MRI LUMBAR SPINE W/O DYE: CPT

## 2017-09-13 PROCEDURE — 72195 MRI PELVIS W/O DYE: CPT

## 2017-09-13 NOTE — TELEPHONE ENCOUNTER
Please call the patient.     The MRI scans of the lumbar spine and sacrum did not show any very concerning findings.   There was no cancer.    There were some degenerative changes throughout the spine, but nothing that was significant.   There was one small disc herniation near the right S1 nerve root, but it was not compressing it.     There was nothing seen on the MRI that would suggest any need for surgery at this time.      I would recommend that she follow the plan as we set up, seeing the physiatrists and have them evaluate her pain and the scans.  She may need a program that may include a steroid injection into the painful spot, (Possibly the sacroiliac joint) and more advanced physical therapy.     --Continue Celebrex twice per day for anti inflammatory relief.     --See Physiatrist (nonosurigcal management of back, joint, muscle pains).                           --Dr. James Costa  [phone],                         --Dr. Alirio Avila ([facility])  [phone]

## 2017-09-15 NOTE — TELEPHONE ENCOUNTER
Patient called back. Writer advised per PCP note below. Patient verbalized understanding. Patient requested MRI results sent to her with the telephone encounter also because of the details in it. Writer mailed out requested information.

## 2017-09-18 ENCOUNTER — TELEPHONE (OUTPATIENT)
Dept: INTERNAL MEDICINE | Facility: CLINIC | Age: 57
End: 2017-09-18

## 2017-09-18 DIAGNOSIS — R05.8 NOCTURNAL COUGH: ICD-10-CM

## 2017-09-18 NOTE — TELEPHONE ENCOUNTER
Qvar 80mcg ins plan does not cover this med: plan alternatives are Ar[employer]ity or [employer] previously faxed PA or chg, is there a status update on this?

## 2017-09-19 PROBLEM — J45.30 MILD PERSISTENT ASTHMA WITHOUT COMPLICATION: Status: ACTIVE | Noted: 2017-09-19

## 2017-09-19 NOTE — TELEPHONE ENCOUNTER
PA denied.  Reason given:      Must be diagnosed with asthma        To appeal will need letter mailed too:      [facility]  Attention:  [employer]  [address] [address] [address]  [place], [place] [address]

## 2017-09-21 ENCOUNTER — HOSPITAL ENCOUNTER (OUTPATIENT)
Dept: MAMMOGRAPHY | Facility: CLINIC | Age: 57
Discharge: HOME | End: 2017-09-21
Attending: INTERNAL MEDICINE | Admitting: INTERNAL MEDICINE
Payer: COMMERCIAL

## 2017-09-21 DIAGNOSIS — Z12.31 VISIT FOR SCREENING MAMMOGRAM: ICD-10-CM

## 2017-09-21 PROCEDURE — G0202 SCR MAMMO BI INCL CAD: HCPCS

## 2017-12-01 DIAGNOSIS — Z79.890 NEED FOR PROPHYLACTIC HORMONE REPLACEMENT THERAPY (POSTMENOPAUSAL): ICD-10-CM

## 2017-12-05 RX ORDER — ESTRADIOL 0.5 MG/1
TABLET ORAL
Qty: 90 TABLET | Refills: 2 | Status: SHIPPED | OUTPATIENT
Start: 2017-12-05 | End: 2018-12-10

## 2018-02-14 ENCOUNTER — TELEPHONE (OUTPATIENT)
Dept: INTERNAL MEDICINE | Facility: CLINIC | Age: 58
End: 2018-02-14

## 2018-02-14 DIAGNOSIS — Z23 NEED FOR SHINGLES VACCINE: Primary | ICD-10-CM

## 2018-02-14 NOTE — TELEPHONE ENCOUNTER
Reason for Call:  Other call back    Detailed comments: She is under age 60 and would like a shingles shot. She was told that she needed a RX from her MD if she wants a shingles shot before age 60. Please call.    Phone Number Patient can be reached at: Cell number on file:    Telephone Information:   Mobile [phone]       Best Time: Any time    Can we leave a detailed message on this number? YES    Call taken on 2/14/2018 at 1:35 PM by Tigist Iraheta

## 2018-02-15 NOTE — TELEPHONE ENCOUNTER
She is OK to get the shingles vaccine.   The latest shinlges vaccine ( Shingrix) is recommended for people as young as age 50.   Two shots, the second one 2-6 months after the first one.   Prescription(s) sent electronically to specified pharmacy.   They need to confirm insurance coverage.  Insurance plans may not have established their coverage yet.   If not covered, each shots costs $140

## 2018-02-15 NOTE — TELEPHONE ENCOUNTER
Pt called [facility] and they have the new shingles shot. Advised pt to double check with insurance to see how much this will cost her. Order pended. PCP please advise.

## 2018-04-04 ENCOUNTER — OFFICE VISIT (OUTPATIENT)
Dept: OBGYN | Facility: CLINIC | Age: 58
End: 2018-04-04
Payer: COMMERCIAL

## 2018-04-04 VITALS
SYSTOLIC BLOOD PRESSURE: 122 MMHG | BODY MASS INDEX: 21.9 KG/M2 | WEIGHT: 119 LBS | HEIGHT: 62 IN | DIASTOLIC BLOOD PRESSURE: 80 MMHG | HEART RATE: 80 BPM

## 2018-04-04 DIAGNOSIS — Z00.00 ENCOUNTER FOR ROUTINE ADULT HEALTH EXAMINATION WITHOUT ABNORMAL FINDINGS: ICD-10-CM

## 2018-04-04 DIAGNOSIS — N89.8 VAGINAL DISCHARGE: Primary | ICD-10-CM

## 2018-04-04 LAB
SPECIMEN SOURCE: NORMAL
WET PREP SPEC: NORMAL

## 2018-04-04 PROCEDURE — 87210 SMEAR WET MOUNT SALINE/INK: CPT | Performed by: OBSTETRICS & GYNECOLOGY

## 2018-04-04 PROCEDURE — 99396 PREV VISIT EST AGE 40-64: CPT | Performed by: OBSTETRICS & GYNECOLOGY

## 2018-04-04 NOTE — MR AVS SNAPSHOT
After Visit Summary   4/4/2018    Kimberly Larkin    MRN: [id number]           Patient Information     Date Of Birth          1960        Visit Information        Provider Department      4/4/2018 1:00 PM Kellee Diaz MD [facility] Instructions    Try your estrogen tab twice weekly for improvement in hot flashes and vaginal discharge.     Preventive Health Recommendations  Female Ages 40-64  Yearly exam:    See your health care provider every year in order to    Review health changes.      Discuss preventive care.       Review your medicines if you your doctor has prescribed any.    Pap Smears: You should have a Pap test every 3 years or have a Pap test with HPV screening every 5 years.    You do not need a Pap test if your uterus was removed (hysterectomy) and you have not had cancer.   Breast Cancer Screening: You should begin mammography for breast cancer screening by age 40 or 50 depending on your personal risks and your doctors recommendation. Screening should occur every year or every other year based on your discussion with your doctor.  Colorectal Cancer Screening: Starting at age 50 you need to undergo colonoscopy (every 5-10 years) or other colon cancer screening.    Health Maintenance:  - Your cholesterol should be checked every 5 years, more often if you have increased risk factors such as diabetes, high blood pressure, tobacco use, obesity, or a strong family history of cardiovascular disease before age 50 in men and 60 in women  - If you are at risk for diabetes due to being overweight or obese, having a strong family history, or having a history of gestational diabetes you should have a diabetes test (fasting glucose or Hemoglobin A1c level) every 3 years.  Shots: Get a flu shot each year. Get a tetanus shot every 10 years.    Nutrition:      Eat at least 5 servings of fruits and vegetables each day.    Eat whole-grain bread, whole-wheat  pasta, faro, quinoa, barley and brown rice instead of white grains and rice.    Consume 1000mg of Calcium and 1000u of Vitamin D daily. If these are not in your regular diet you should take a supplement.    To calculate the calcium in your food add a zero to the percent found on the packaging (ex: 30% = 300mg of calcium per serving)    Good sources of Calcium include:    Low-fat dairy products (200 to 300 milligrams per serving)      Dark green leafy vegetables     Canned salmon or sardines with bones     Soy products, such as tofu     Calcium-fortified cereals and orange juice    Osteopenia is low bone mass, your risk increases once you reach menopause and your calcium needs then increase to 1,200mg daily    The [place] of Medicine recommends that total calcium intake, from supplements and diet combined, should be no more than 2,000 milligrams daily for people older than 50.    Lifestyle    Get in at least 150 minutes of physical activity a week (30 minutes a day, 5 days of the week), of which about half should be vigorous exercise (jogging, swimming, lifting weights).  This will help you control your weight and prevent disease. You must increase the intensity and duration of exercise in order to lose weight, if that is your goal. To keep your bones strong and prevent fractures, plan at least 90 min/week of high impact exercise.    High-impact exercise includes workouts like:  Brisk walking.  Climbing stairs.  Dancing.  Hiking.  Jogging.  Jumping rope.  Step aerobics.  Light weight lifting routines.  Noe Chi and yoga.  Tennis or other racquet sports.      Limit alcohol to one drink per day.    No smoking.      Wear sunscreen to prevent skin cancer.    See your dentist every six months for an exam and cleaning        Menopause and Perimenopause    Hot flashes, night sweats, mood changes, sleep disturbances, changes in the menstrual periods, decreased interest in sex, vaginal dryness or painful sex, and changes in  the skin and hair are all common symptoms of perimenopause (the period of time, lasting several years, leading up to menopause). Menopause is defined as 12 months without a menstrual period.     It might be helpful to make a list of your symptoms, and to rank them in order of how much they bother you, or which ones you would fix first if you could. This can help us decide what treatment options might be best for you. Treatment can include one or more of the following: lifestyle changes like diet and exercise, supplements, prescription medications for hormones, and other prescriptions that are nonhormonal. We will discuss these in more detail after your test results (if you are having lab work done) are available, when you come back. Some tests that might be done for women in perimenopause include blood work for hormone levels and to check the thyroid or ultrasound or biopsy of the lining of the uterus (if you are having abnormal bleeding).             Follow-ups after your visit        Who to contact     If you have questions or need follow up information about today's clinic visit or your schedule please contact [facility] directly at [phone].  Normal or non-critical lab and imaging results will be communicated to you by Tr[employer]obhart, letter or phone within 4 business days after the clinic has received the results. If you do not hear from us within 7 days, please contact the clinic through Aircomt or phone. If you have a critical or abnormal lab result, we will notify you by phone as soon as possible.  Submit refill requests through [employer] or call your pharmacy and they will forward the refill request to us. Please allow 3 business days for your refill to be completed.          Additional Information About Your Visit        DeskLodge Information     [employer] lets you send messages to your doctor, view your test results, renew your prescriptions, schedule appointments and more. To sign up, go  "to [email or web address] . Click on \"Log in\" on the left side of the screen, which will take you to the Welcome page. Then click on \"Sign up Now\" on the right side of the page.     You will be asked to enter the access code listed below, as well as some personal information. Please follow the directions to create your username and password.     Your access code is: [email or web address]  Expires: 7/3/2018  1:41 PM     Your access code will  in 90 days. If you need help or a new code, please call your [place] clinic or [phone].        Care EveryWhere ID     This is your Care EveryWhere ID. This could be used by other organizations to access your [place] medical records  [id number]        Your Vitals Were     Pulse Height Last Period BMI (Body Mass Index)          80 5' 2\" (1.575 m) 10/12/2006 21.77 kg/m2         Blood Pressure from Last 3 Encounters:   18 122/80   17 122/76   17 132/82    Weight from Last 3 Encounters:   18 119 lb (54 kg)   17 120 lb 9.6 oz (54.7 kg)   17 120 lb 1.6 oz (54.5 kg)              Today, you had the following     No orders found for display       Primary Care Provider Office Phone # Fax #    Gilberto Clifford [phone]       [address] [place] [id number]        Equal Access to Services     [facility] AH: Hadii aad ku hadasho Soomaali, waaxda luqadaha, qaybta kaalmada adeegyada, carloz delong. So [facility] [phone].    ATENCIÓN: Si habla español, tiene a richter disposición servicios gratuitos de asistencia lingüística. Llame al [phone].    We comply with applicable federal civil rights laws and [place] laws. We do not discriminate on the basis of race, color, national origin, age, disability, sex, sexual orientation, or gender identity.            Thank you!     Thank you for choosing [facility]  for your care. Our goal is always to provide you with excellent " care. Hearing back from our patients is one way we can continue to improve our services. Please take a few minutes to complete the written survey that you may receive in the mail after your visit with us. Thank you!             Your Updated Medication List - Protect others around you: Learn how to safely use, store and throw away your medicines at www.disposemymeds.org.          This list is accurate as of 4/4/18  1:41 PM.  Always use your most recent med list.                   Brand Name Dispense Instructions for use Diagnosis    CALCIUM 600 + D 600-200 MG-UNIT Tabs     3 MONTHS    takes one per day        celecoxib 200 MG capsule    celeBREX    60 capsule    Take 1 capsule (200 mg) by mouth 2 times daily as needed for moderate pain    Sacroiliac pain       estradiol 0.5 MG tablet    ESTRACE    90 tablet    TAKE 1 TABLET BY MOUTH EVERY OTHER DAY    Need for prophylactic hormone replacement therapy (postmenopausal)       fluticasone 50 MCG/ACT spray    FLONASE    16 g    Spray 1-2 sprays into both nostrils daily    Acute sinusitis treated with antibiotics in the past 60 days       fluticasone furoate 100 MCG/ACT Aepb inhalation powder    ARNUITY ELLIPTA    1 each    Inhale 1 puff into the lungs daily    Mild persistent asthma without complication       multivitamin per tablet     100    ONE DAILY

## 2018-04-04 NOTE — PATIENT INSTRUCTIONS
Try your estrogen tab twice weekly for improvement in hot flashes and vaginal discharge.     Preventive Health Recommendations  Female Ages 40-64  Yearly exam:    See your health care provider every year in order to    Review health changes.      Discuss preventive care.       Review your medicines if you your doctor has prescribed any.    Pap Smears: You should have a Pap test every 3 years or have a Pap test with HPV screening every 5 years.    You do not need a Pap test if your uterus was removed (hysterectomy) and you have not had cancer.   Breast Cancer Screening: You should begin mammography for breast cancer screening by age 40 or 50 depending on your personal risks and your doctors recommendation. Screening should occur every year or every other year based on your discussion with your doctor.  Colorectal Cancer Screening: Starting at age 50 you need to undergo colonoscopy (every 5-10 years) or other colon cancer screening.    Health Maintenance:  - Your cholesterol should be checked every 5 years, more often if you have increased risk factors such as diabetes, high blood pressure, tobacco use, obesity, or a strong family history of cardiovascular disease before age 50 in men and 60 in women  - If you are at risk for diabetes due to being overweight or obese, having a strong family history, or having a history of gestational diabetes you should have a diabetes test (fasting glucose or Hemoglobin A1c level) every 3 years.  Shots: Get a flu shot each year. Get a tetanus shot every 10 years.    Nutrition:      Eat at least 5 servings of fruits and vegetables each day.    Eat whole-grain bread, whole-wheat pasta, faro, quinoa, barley and brown rice instead of white grains and rice.    Consume 1000mg of Calcium and 1000u of Vitamin D daily. If these are not in your regular diet you should take a supplement.    To calculate the calcium in your food add a zero to the percent found on the packaging (ex: 30% = 300mg  of calcium per serving)    Good sources of Calcium include:    Low-fat dairy products (200 to 300 milligrams per serving)      Dark green leafy vegetables     Canned salmon or sardines with bones     Soy products, such as tofu     Calcium-fortified cereals and orange juice    Osteopenia is low bone mass, your risk increases once you reach menopause and your calcium needs then increase to 1,200mg daily    The [place] of Medicine recommends that total calcium intake, from supplements and diet combined, should be no more than 2,000 milligrams daily for people older than 50.    Lifestyle    Get in at least 150 minutes of physical activity a week (30 minutes a day, 5 days of the week), of which about half should be vigorous exercise (jogging, swimming, lifting weights).  This will help you control your weight and prevent disease. You must increase the intensity and duration of exercise in order to lose weight, if that is your goal. To keep your bones strong and prevent fractures, plan at least 90 min/week of high impact exercise.    High-impact exercise includes workouts like:  Brisk walking.  Climbing stairs.  Dancing.  Hiking.  Jogging.  Jumping rope.  Step aerobics.  Light weight lifting routines.  Noe Chi and yoga.  Tennis or other racquet sports.      Limit alcohol to one drink per day.    No smoking.      Wear sunscreen to prevent skin cancer.    See your dentist every six months for an exam and cleaning        Menopause and Perimenopause    Hot flashes, night sweats, mood changes, sleep disturbances, changes in the menstrual periods, decreased interest in sex, vaginal dryness or painful sex, and changes in the skin and hair are all common symptoms of perimenopause (the period of time, lasting several years, leading up to menopause). Menopause is defined as 12 months without a menstrual period.     It might be helpful to make a list of your symptoms, and to rank them in order of how much they bother you, or which  ones you would fix first if you could. This can help us decide what treatment options might be best for you. Treatment can include one or more of the following: lifestyle changes like diet and exercise, supplements, prescription medications for hormones, and other prescriptions that are nonhormonal. We will discuss these in more detail after your test results (if you are having lab work done) are available, when you come back. Some tests that might be done for women in perimenopause include blood work for hormone levels and to check the thyroid or ultrasound or biopsy of the lining of the uterus (if you are having abnormal bleeding).

## 2018-04-04 NOTE — NURSING NOTE
"Chief Complaint   Patient presents with     Physical     Last pap 11/24/15-NIL with negative HPV     Vaginal Problem     discharge x2 weeks, patient reports its yellow, and watery, no c/o of itching or odor       Initial /80 (BP Location: Right arm, Patient Position: Chair, Cuff Size: Adult Regular)  Pulse 80  Ht 5' 2\" (1.575 m)  Wt 119 lb (54 kg)  LMP 10/12/2006  BMI 21.77 kg/m2 Estimated body mass index is 21.77 kg/(m^2) as calculated from the following:    Height as of this encounter: 5' 2\" (1.575 m).    Weight as of this encounter: 119 lb (54 kg).  Medication Reconciliation: complete     Bree Palacios CMA      "

## 2018-04-04 NOTE — PROGRESS NOTES
Gynecology Clinic Visit:    SUBJECTIVE:     Kimberly is a 58 year old  who presents for annual exam. She underwent total abdominal hysterectomy bilateral salpingo-oophorectomy 2006 for fibroid uterus. Was put on estrace 0.5mg QoD, now using it only once weekly for assistance with hot flashes and vaginal dryness, she does have occasional hot flashes still. No vaginal bleeding noted.      Besides routine health maintenance, vaginal discharge x2 weeks.  GYNECOLOGIC HISTORY:  She is sexually active with 1 male partner.    Family history of breast CA: No  Family history of uterine/ovarian CA: No  Family history of colon CA: No    HEALTH MAINTENANCE:  Last Mammogram: 2017 . History of abnormal Mammo: No  Pap; (  Lab Results   Component Value Date    PAP NIL, HPV negative 2015    PAP NIL 2012    PAP NIL 2011     HISTORY:  Patient Active Problem List   Diagnosis     Leiomyoma of uterus     Anemia     Malignant neoplasm of trachea, bronchus, and lung (H)     Low back pain     CARDIOVASCULAR SCREENING; LDL GOAL LESS THAN 160     Advanced directives, counseling/discussion     Lumbar disc herniation with radiculopathy     Backache     Routine general medical examination at a health care facility     Encounter for routine gynecological examination     GERD (gastroesophageal reflux disease)     Hyperthyroidism     HSV-2 seropositive     Mild persistent asthma without complication     Past Surgical History:   Procedure Laterality Date     C THORACOSCOPY SURG LOBECTOMY  3/30/07    Squamous cell carcinoma, left lower lobe lung, surgical stage T2N0M0     C/SECTION, LOW TRANSVERSE      , Low Transverse x 2     CHOLECYSTECTOMY, LAPOROSCOPIC  11    Cholecystectomy, Laparoscopic with intraoperative cholangiogram     COLONOSCOPY  11    normal colonoscopy     HYSTERECTOMY, PAP NO LONGER INDICATED      LEYDI/BSO for myoma     HYSTERECTOMY, LEYDI      LEYDI/BSO for myoma      Social History  "  Substance Use Topics     Smoking status: Never Smoker     Smokeless tobacco: Never Used     Alcohol use No      Problem (# of Occurrences) Relation (Name,Age of Onset)    C.A.D. (1) Father    CANCER (1) Brother:  throat cancer age 36    CEREBROVASCULAR DISEASE (1) Father    DIABETES (1) Mother    Eye Disorder (1) Father: cataract    Family History Negative (4) Brother, Brother, Brother, Sister    Hypertension (1) Father              Obstetric History       T2      L2     SAB0   TAB0   Ectopic0   Multiple0   Live Births0       # Outcome Date GA Lbr Ernst/2nd Weight Sex Delivery Anes PTL Lv   2 Term            1 Term                 Past Medical History:   Diagnosis Date     Helicobacter pylori gastritis 14    per EGD biopsy; treated with 14 days triple therapy by GI     Hyperthyroidism      Malignant neoplasm of bronchus and lung, unspecified site 3/30/07    Squamous cell carcinoma, left lower lobe lung, surgical stage T2N0M0         Current Outpatient Prescriptions:      estradiol (ESTRACE) 0.5 MG tablet, TAKE 1 TABLET BY MOUTH EVERY OTHER DAY, Disp: 90 tablet, Rfl: 2     fluticasone furoate (ARNUITY ELLIPTA) 100 MCG/ACT AEPB inhalation powder, Inhale 1 puff into the lungs daily, Disp: 1 each, Rfl: 2     celecoxib (CELEBREX) 200 MG capsule, Take 1 capsule (200 mg) by mouth 2 times daily as needed for moderate pain, Disp: 60 capsule, Rfl: 10     fluticasone (FLONASE) 50 MCG/ACT nasal spray, Spray 1-2 sprays into both nostrils daily, Disp: 16 g, Rfl: 2     CALCIUM 600 + D 600-200 MG-UNIT OR TABS, takes one per day, Disp: 3 MONTHS, Rfl: 1 YEAR     MULTIVITAMINS OR TABS, ONE DAILY, Disp: 100, Rfl: 1 YEAR  Allergies   Allergen Reactions     Seasonal Allergies      Sneezing, sinus congestion, runny nose     OBJECTIVE:     EXAM:  /80 (BP Location: Right arm, Patient Position: Chair, Cuff Size: Adult Regular)  Pulse 80  Ht 5' 2\" (1.575 m)  Wt 119 lb (54 kg)  LMP 10/12/2006  BMI 21.77 " kg/m2 Body mass index is 21.77 kg/(m^2).   Constitutional: healthy, alert and no distress  Head: Normocephalic. No masses, lesions, tenderness or abnormalities  Neck: Neck supple. Trachea midline. No adenopathy. Thyroid symmetric, normal size.   Cardiovascular: RRR.   Respiratory: Negative.   Breast: No visible masses or suspicious skin changes.  No discrete or dominant masses to palpation.  No axillary lymphadenopathy.  Gastrointestinal: Abdomen soft, non-tender, non-distended. No masses, organomegaly  : External genitalia normal healthy tissue.  No obvious excoriations, lesions, or rashes. Bartholins, urethra, skeins normal.  Normal pink vaginal mucosa.  SSE: uterus and cervix surgically absent. Scant yellow discharge, atrophic vaginal walls. PH >5.5  Bimanual: cuff intact, No adnexal masses or tenderness appreciated.    Ovaries:  No masses appreciated, non-tender, mobile  Musculoskeletal: extremities normal  Skin: no suspicious lesions or rashes  Psychiatric: Affect appropriate, cooperative, mentation appears normal.     Results for orders placed or performed in visit on 18   Wet prep   Result Value Ref Range    Specimen Description Vagina     Wet Prep No yeast seen     Wet Prep No Trichomonas seen     Wet Prep No clue cells seen         ASSESSMENT/PLAN:                                                    Kimberly Larkin is a 58 year old female  with satisfactory annual exam and concern for vaginal discharge.    PLAN:  Dx:  1)  Pap smears no longer indicated s/p hysterectomy with no history of CHARLETTE II or greater abnormality.  2)  Mammogram: due in 2018  3)  Estrogen replacement: reviewed risks and benefits. Will plan to increase dose to twice weekly due to occasional hot flashes and vaginal discharge.   4) Vaginal discharge: wet prep negative. Discharge likely associated with atrophy. Can consider vaginal estrogen in the future if she discontinues systemic HRT.     PE:  Reviewed health maintenance  including diet, regular exercise   and periodic exams.    Return to clinic in 1 year, sooner for ongoing vaginal concerns.    COUNSELING:   Reviewed preventive health counseling, as reflected in patient instructions   reports that she has never smoked. She has never used smokeless tobacco.        FRAX Risk Assessment    Kellee Diaz MD   Obstetrics and Gynecology  4/4/2018

## 2018-06-01 DIAGNOSIS — M53.3 SACROILIAC PAIN: ICD-10-CM

## 2018-06-01 NOTE — LETTER
[facility]  [address] [id number] [phone]            Kimberly Larkin  [address]  [facility] [address]        June 5, 2018    Dear Kimberly,    While refilling your prescription today, we noticed that you are due to have labs drawn.  We will refill your prescription for 30 days, but a follow-up appointment must be made before any additional refills can be approved.     Taking care of your health is important to us and we look forward to seeing you in the near future.  Please call us at [phone] or [phone] (or use [employer]) to schedule an appointment.     Please disregard this notice if you have already made an appointment.    Sincerely,        [facility]

## 2018-06-02 NOTE — TELEPHONE ENCOUNTER
"Requested Prescriptions   Pending Prescriptions Disp Refills     celecoxib (CELEBREX) 200 MG capsule [Pharmacy Med Name: CELECOXIB 200MG CAPSULES]  Last Written Prescription Date:  2/14/2017  Last Fill Quantity: 60 capsule,  # refills: 10   Last Office Visit: 9/7/2017   Future Office Visit:      60 capsule 0     Sig: TAKE 1 CAPSULE(200 MG) BY MOUTH TWICE DAILY AS NEEDED FOR MODERATE PAIN    NSAID Medications Failed    6/1/2018  6:26 PM       Failed - Normal ALT on file in past 12 months    Recent Labs   Lab Test  01/03/17   1220   ALT  30            Failed - Normal AST on file in past 12 months    Recent Labs   Lab Test  01/03/17   1220   AST  18            Failed - Normal CBC on file in past 12 months    Recent Labs   Lab Test  01/03/17   1220   WBC  7.8   RBC  5.32*   HGB  13.0   HCT  40.0   PLT  352       For [facility] ONLY: [id number] = WBC, CNII666 = RBC         Failed - Normal serum creatinine on file in past 12 months    Recent Labs   Lab Test  01/03/17   1220   CR  0.75            Passed - Blood pressure under 140/90 in past 12 months    BP Readings from Last 3 Encounters:   04/04/18 122/80   09/07/17 122/76   01/03/17 132/82          Passed - Recent (12 mo) or future (30 days) visit within the authorizing provider's specialty    Patient had office visit in the last 12 months or has a visit in the next 30 days with authorizing provider or within the authorizing provider's specialty.  See \"Patient Info\" tab in inbasket, or \"Choose Columns\" in Meds & Orders section of the refill encounter.           Passed - Patient is age 6-64 years       Passed - No active pregnancy on record       Passed - No positive pregnancy test in past 12 months          "

## 2018-06-05 RX ORDER — CELECOXIB 200 MG/1
CAPSULE ORAL
Qty: 60 CAPSULE | Refills: 0 | Status: SHIPPED | OUTPATIENT
Start: 2018-06-05 | End: 2018-07-13

## 2018-06-05 NOTE — TELEPHONE ENCOUNTER
Medication is being filled for 1 time refill only due to:  Patient needs labs , non fasting. .   Letter sent.

## 2018-06-11 DIAGNOSIS — M53.3 SACROILIAC PAIN: ICD-10-CM

## 2018-06-11 LAB
ALT SERPL W P-5'-P-CCNC: 24 U/L (ref 0–50)
AST SERPL W P-5'-P-CCNC: 19 U/L (ref 0–45)
CREAT SERPL-MCNC: 0.81 MG/DL (ref 0.52–1.04)
ERYTHROCYTE [DISTWIDTH] IN BLOOD BY AUTOMATED COUNT: 13.8 % (ref 10–15)
GFR SERPL CREATININE-BSD FRML MDRD: 73 ML/MIN/1.7M2
HCT VFR BLD AUTO: 39.8 % (ref 35–47)
HGB BLD-MCNC: 13.1 G/DL (ref 11.7–15.7)
MCH RBC QN AUTO: 25.1 PG (ref 26.5–33)
MCHC RBC AUTO-ENTMCNC: 32.9 G/DL (ref 31.5–36.5)
MCV RBC AUTO: 76 FL (ref 78–100)
PLATELET # BLD AUTO: 370 10E9/L (ref 150–450)
RBC # BLD AUTO: 5.21 10E12/L (ref 3.8–5.2)
WBC # BLD AUTO: 6.4 10E9/L (ref 4–11)

## 2018-06-11 PROCEDURE — 82565 ASSAY OF CREATININE: CPT | Performed by: INTERNAL MEDICINE

## 2018-06-11 PROCEDURE — 85027 COMPLETE CBC AUTOMATED: CPT | Performed by: INTERNAL MEDICINE

## 2018-06-11 PROCEDURE — 84460 ALANINE AMINO (ALT) (SGPT): CPT | Performed by: INTERNAL MEDICINE

## 2018-06-11 PROCEDURE — 84450 TRANSFERASE (AST) (SGOT): CPT | Performed by: INTERNAL MEDICINE

## 2018-06-11 PROCEDURE — 36415 COLL VENOUS BLD VENIPUNCTURE: CPT | Performed by: INTERNAL MEDICINE

## 2018-06-25 ENCOUNTER — TELEPHONE (OUTPATIENT)
Dept: INTERNAL MEDICINE | Facility: CLINIC | Age: 58
End: 2018-06-25

## 2018-06-26 DIAGNOSIS — A60.04 HERPES SIMPLEX VULVOVAGINITIS: ICD-10-CM

## 2018-06-26 DIAGNOSIS — A60.00 RECURRENT GENITAL HSV (HERPES SIMPLEX VIRUS) INFECTION: ICD-10-CM

## 2018-06-27 RX ORDER — VALACYCLOVIR HYDROCHLORIDE 500 MG/1
TABLET, FILM COATED ORAL
Qty: 90 TABLET | Refills: 0 | Status: SHIPPED | OUTPATIENT
Start: 2018-06-27 | End: 2019-01-07

## 2018-06-27 NOTE — TELEPHONE ENCOUNTER
"Requested Prescriptions   Pending Prescriptions Disp Refills     valACYclovir (VALTREX) 500 MG tablet [Pharmacy Med Name: VALACYCLOVIR 500MG TABLETS] 90 tablet 0     Sig: TAKE 1 TABLET(500 MG) BY MOUTH DAILY    Antivirals for Herpes Protocol Passed    6/26/2018  8:04 PM       Passed - Patient is age 12 or older       Passed - Recent (12 mo) or future (30 days) visit within the authorizing provider's specialty    Patient had office visit in the last 12 months or has a visit in the next 30 days with authorizing provider or within the authorizing provider's specialty.  See \"Patient Info\" tab in inbasket, or \"Choose Columns\" in Meds & Orders section of the refill encounter.           Passed - Normal serum creatinine on file in past 12 months    Recent Labs   Lab Test  06/11/18   1049   CR  0.81             Routing refill request to provider for review/approval because:  Drug not active on patient's medication list        "

## 2018-07-13 DIAGNOSIS — M53.3 SACROILIAC PAIN: ICD-10-CM

## 2018-07-13 RX ORDER — CELECOXIB 200 MG/1
CAPSULE ORAL
Qty: 60 CAPSULE | Refills: 3 | Status: SHIPPED | OUTPATIENT
Start: 2018-07-13 | End: 2019-04-04

## 2018-07-13 NOTE — TELEPHONE ENCOUNTER
"Requested Prescriptions   Pending Prescriptions Disp Refills     celecoxib (CELEBREX) 200 MG capsule  Last Written Prescription Date:  6/5/18  Last Fill Quantity: 60 capsule,  # refills: 0   Last Office Visit: 9/7/2017   Future Office Visit:      60 capsule 0    NSAID Medications Passed    7/13/2018 10:45 AM       Passed - Blood pressure under 140/90 in past 12 months    BP Readings from Last 3 Encounters:   04/04/18 122/80   09/07/17 122/76   01/03/17 132/82          Passed - Normal ALT on file in past 12 months    Recent Labs   Lab Test  06/11/18   1049   ALT  24            Passed - Normal AST on file in past 12 months    Recent Labs   Lab Test  06/11/18   1049   AST  19            Passed - Recent (12 mo) or future (30 days) visit within the authorizing provider's specialty    Patient had office visit in the last 12 months or has a visit in the next 30 days with authorizing provider or within the authorizing provider's specialty.  See \"Patient Info\" tab in inbasket, or \"Choose Columns\" in Meds & Orders section of the refill encounter.           Passed - Patient is age 6-64 years       Passed - Normal CBC on file in past 12 months    Recent Labs   Lab Test  06/11/18   1049   WBC  6.4   RBC  5.21*   HGB  13.1   HCT  39.8   PLT  370       For [facility] ONLY: [id number] = WBC, [id number] = RBC         Passed - No active pregnancy on record       Passed - Normal serum creatinine on file in past 12 months    Recent Labs   Lab Test  06/11/18   1049   CR  0.81            Passed - No positive pregnancy test in past 12 months          "

## 2018-09-25 ENCOUNTER — HOSPITAL ENCOUNTER (OUTPATIENT)
Dept: MAMMOGRAPHY | Facility: CLINIC | Age: 58
Discharge: HOME | End: 2018-09-25
Attending: INTERNAL MEDICINE | Admitting: INTERNAL MEDICINE
Payer: COMMERCIAL

## 2018-09-25 DIAGNOSIS — Z12.31 VISIT FOR SCREENING MAMMOGRAM: ICD-10-CM

## 2018-09-25 PROCEDURE — 77067 SCR MAMMO BI INCL CAD: CPT

## 2018-12-10 DIAGNOSIS — Z79.890 NEED FOR PROPHYLACTIC HORMONE REPLACEMENT THERAPY (POSTMENOPAUSAL): ICD-10-CM

## 2018-12-10 NOTE — LETTER
[facility]  [address] [id number] [phone]            Kimberly Larkin  [address]  [facility] [address]        December 11, 2018    Dear Kimberly,    While refilling your prescription today, we noticed that you are due for an appointment with your provider.  We will refill your prescription for 30 days, but a follow-up appointment must be made before any additional refills can be approved.     Taking care of your health is important to us and we look forward to seeing you in the near future.  Please call us at [phone] or [phone] (or use [phone]) to schedule an appointment.     Please disregard this notice if you have already made an appointment.    Sincerely,        [facility]

## 2018-12-11 RX ORDER — ESTRADIOL 0.5 MG/1
TABLET ORAL
Qty: 30 TABLET | Refills: 0 | Status: SHIPPED | OUTPATIENT
Start: 2018-12-11 | End: 2019-09-10

## 2018-12-11 NOTE — TELEPHONE ENCOUNTER
"Requested Prescriptions   Pending Prescriptions Disp Refills     estradiol (ESTRACE) 0.5 MG tablet [Pharmacy Med Name: ESTRADIOL 0.5MG TABLETS]  Last Written Prescription Date:  12/05/2017  Last Fill Quantity: 90,  # refills: 02   Last Office Visit: 9/7/2017   Future Office Visit:      90 tablet 0     Sig: TAKE 1 TABLET BY MOUTH EVERY OTHER DAY    Hormone Replacement Therapy Failed - 12/10/2018  1:49 PM       Failed - Recent (12 mo) or future (30 days) visit within the authorizing provider's specialty    Patient had office visit in the last 12 months or has a visit in the next 30 days with authorizing provider or within the authorizing provider's specialty.  See \"Patient Info\" tab in inbasket, or \"Choose Columns\" in Meds & Orders section of the refill encounter.             Passed - Blood pressure under 140/90 in past 12 months    BP Readings from Last 3 Encounters:   04/04/18 122/80   09/07/17 122/76   01/03/17 132/82                Passed - Patient has mammogram in past 2 years on file if age 50-75       Passed - Patient is 18 years of age or older       Passed - No active pregnancy on record       Passed - No positive pregnancy test on record in past 12 months          "

## 2019-01-07 DIAGNOSIS — A60.00 RECURRENT GENITAL HSV (HERPES SIMPLEX VIRUS) INFECTION: ICD-10-CM

## 2019-01-07 DIAGNOSIS — A60.04 HERPES SIMPLEX VULVOVAGINITIS: ICD-10-CM

## 2019-01-07 NOTE — LETTER
[facility]  [address] [id number] [phone]            Kimberly Larkin  [address]  [facility] [address]        January 8, 2019    Dear Kimberly,    While refilling your prescription today, we noticed that you are due for an appointment with your provider.  We will refill your prescription for 30 days, but a follow-up appointment must be made before any additional refills can be approved.     Taking care of your health is important to us and we look forward to seeing you in the near future.  Please call us at [phone] or [phone] (or use [phone]) to schedule an appointment.     Please disregard this notice if you have already made an appointment.    Sincerely,        [facility]

## 2019-01-07 NOTE — TELEPHONE ENCOUNTER
"Requested Prescriptions   Pending Prescriptions Disp Refills     valACYclovir (VALTREX) 500 MG tablet [Pharmacy Med Name: VALACYCLOVIR 500MG TABLETS]  Last Written Prescription Date:  6/27/2018  Last Fill Quantity: 90 tablet,  # refills: 0   Last Office Visit: 9/7/2017   Future Office Visit:      90 tablet 0     Sig: TAKE 1 TABLET(500 MG) BY MOUTH DAILY    Antivirals for Herpes Protocol Failed - 1/7/2019  1:42 PM       Failed - Recent (12 mo) or future (30 days) visit within the authorizing provider's specialty    Patient had office visit in the last 12 months or has a visit in the next 30 days with authorizing provider or within the authorizing provider's specialty.  See \"Patient Info\" tab in inbasket, or \"Choose Columns\" in Meds & Orders section of the refill encounter.             Passed - Patient is age 12 or older       Passed - Medication is active on med list       Passed - Normal serum creatinine on file in past 12 months    Recent Labs   Lab Test 06/11/18  1049   CR 0.81               "

## 2019-01-08 RX ORDER — VALACYCLOVIR HYDROCHLORIDE 500 MG/1
TABLET, FILM COATED ORAL
Qty: 30 TABLET | Refills: 0 | Status: SHIPPED | OUTPATIENT
Start: 2019-01-08 | End: 2019-04-04

## 2019-04-04 DIAGNOSIS — A60.00 RECURRENT GENITAL HSV (HERPES SIMPLEX VIRUS) INFECTION: ICD-10-CM

## 2019-04-04 DIAGNOSIS — M53.3 SACROILIAC PAIN: ICD-10-CM

## 2019-04-04 DIAGNOSIS — A60.04 HERPES SIMPLEX VULVOVAGINITIS: ICD-10-CM

## 2019-04-04 RX ORDER — CELECOXIB 200 MG/1
CAPSULE ORAL
Qty: 60 CAPSULE | Refills: 3 | Status: SHIPPED | OUTPATIENT
Start: 2019-04-04 | End: 2019-12-05

## 2019-04-04 RX ORDER — VALACYCLOVIR HYDROCHLORIDE 500 MG/1
500 TABLET, FILM COATED ORAL DAILY
Qty: 30 TABLET | Refills: 1 | Status: SHIPPED | OUTPATIENT
Start: 2019-04-04 | End: 2020-03-04

## 2019-04-04 NOTE — TELEPHONE ENCOUNTER
Requested Prescriptions   Pending Prescriptions Disp Refills     celecoxib (CELEBREX) 200 MG capsule  Last Written Prescription Date:  07/13/2018  Last Fill Quantity: 60,  # refills: 03   Last Office Visit: 9/7/2017   Future Office Visit:    Next 5 appointments (look out 90 days)    Apr 09, 2019  2:20 PM CDT  PHYSICAL with Gilberto Clifford MD  [facility] ([address] [id number]  [phone]          60 capsule 3     Sig: TAKE 1 CAPSULE(200 MG) BY MOUTH TWICE DAILY AS NEEDED FOR MODERATE PAIN    There is no refill protocol information for this order        valACYclovir (VALTREX) 500 MG tablet  Last Written Prescription Date:  01/08/2019  Last Fill Quantity: 30,  # refills: 0   Last Office Visit: 9/7/2017   Future Office Visit:    Next 5 appointments (look out 90 days)    Apr 09, 2019  2:20 PM CDT  PHYSICAL with Gilberto Clifford MD  [facility] ([facility]) [address] [place] [id number]  [phone]          30 tablet 0     Sig: Take 1 tablet (500 mg) by mouth daily    There is no refill protocol information for this order           no

## 2019-04-04 NOTE — TELEPHONE ENCOUNTER
"Requested Prescriptions   Pending Prescriptions Disp Refills     celecoxib (CELEBREX) 200 MG capsule 60 capsule 3     Sig: TAKE 1 CAPSULE(200 MG) BY MOUTH TWICE DAILY AS NEEDED FOR MODERATE PAIN    NSAID Medications Failed - 4/4/2019  7:36 AM       Failed - Normal CBC on file in past 12 months    Recent Labs   Lab Test 06/11/18  1049   WBC 6.4   RBC 5.21*   HGB 13.1   HCT 39.8                   Passed - Blood pressure under 140/90 in past 12 months    BP Readings from Last 3 Encounters:   04/04/18 122/80   09/07/17 122/76   01/03/17 132/82                Passed - Normal ALT on file in past 12 months    Recent Labs   Lab Test 06/11/18  1049   ALT 24            Passed - Normal AST on file in past 12 months    Recent Labs   Lab Test 06/11/18  1049   AST 19            Passed - Recent (12 mo) or future (30 days) visit within the authorizing provider's specialty    Patient had office visit in the last 12 months or has a visit in the next 30 days with authorizing provider or within the authorizing provider's specialty.  See \"Patient Info\" tab in inbasket, or \"Choose Columns\" in Meds & Orders section of the refill encounter.             Passed - Patient is age 6-64 years       Passed - Medication is active on med list       Passed - No active pregnancy on record       Passed - Normal serum creatinine on file in past 12 months    Recent Labs   Lab Test 06/11/18  1049   CR 0.81            Passed - No positive pregnancy test in past 12 months        valACYclovir (VALTREX) 500 MG tablet 30 tablet 0     Sig: Take 1 tablet (500 mg) by mouth daily    Antivirals for Herpes Protocol Passed - 4/4/2019  7:36 AM       Passed - Patient is age 12 or older       Passed - Recent (12 mo) or future (30 days) visit within the authorizing provider's specialty    Patient had office visit in the last 12 months or has a visit in the next 30 days with authorizing provider or within the authorizing provider's specialty.  See \"Patient Info\" " "tab in inbasket, or \"Choose Columns\" in Meds & Orders section of the refill encounter.             Passed - Medication is active on med list       Passed - Normal serum creatinine on file in past 12 months    Recent Labs   Lab Test 06/11/18  1049   CR 0.81             Routing refill request to provider for review/approval because:  Labs out of range:  cbc        "

## 2019-04-09 ENCOUNTER — OFFICE VISIT (OUTPATIENT)
Dept: INTERNAL MEDICINE | Facility: CLINIC | Age: 59
End: 2019-04-09
Payer: COMMERCIAL

## 2019-04-09 VITALS
SYSTOLIC BLOOD PRESSURE: 120 MMHG | TEMPERATURE: 98.2 F | OXYGEN SATURATION: 98 % | BODY MASS INDEX: 21.09 KG/M2 | WEIGHT: 119 LBS | DIASTOLIC BLOOD PRESSURE: 80 MMHG | HEART RATE: 94 BPM | HEIGHT: 63 IN

## 2019-04-09 DIAGNOSIS — C34.80 MALIGNANT NEOPLASM OF TRACHEA, BRONCHUS, AND LUNG (H): ICD-10-CM

## 2019-04-09 DIAGNOSIS — M51.16 LUMBAR DISC HERNIATION WITH RADICULOPATHY: ICD-10-CM

## 2019-04-09 DIAGNOSIS — Z00.01 ENCOUNTER FOR ROUTINE ADULT MEDICAL EXAM WITH ABNORMAL FINDINGS: Primary | ICD-10-CM

## 2019-04-09 DIAGNOSIS — E05.90 HYPERTHYROIDISM: ICD-10-CM

## 2019-04-09 DIAGNOSIS — Z11.4 SCREENING FOR HIV (HUMAN IMMUNODEFICIENCY VIRUS): ICD-10-CM

## 2019-04-09 DIAGNOSIS — A60.00 RECURRENT GENITAL HSV (HERPES SIMPLEX VIRUS) INFECTION: ICD-10-CM

## 2019-04-09 DIAGNOSIS — C33 MALIGNANT NEOPLASM OF TRACHEA, BRONCHUS, AND LUNG (H): ICD-10-CM

## 2019-04-09 DIAGNOSIS — Z11.59 NEED FOR HEPATITIS C SCREENING TEST: ICD-10-CM

## 2019-04-09 DIAGNOSIS — M53.3 SACROILIAC PAIN: ICD-10-CM

## 2019-04-09 DIAGNOSIS — J45.30 MILD PERSISTENT ASTHMA WITHOUT COMPLICATION: ICD-10-CM

## 2019-04-09 DIAGNOSIS — A60.04 HERPES SIMPLEX VULVOVAGINITIS: ICD-10-CM

## 2019-04-09 DIAGNOSIS — Z13.6 CARDIOVASCULAR SCREENING; LDL GOAL LESS THAN 160: ICD-10-CM

## 2019-04-09 LAB
ALBUMIN SERPL-MCNC: 4.2 G/DL (ref 3.4–5)
ALP SERPL-CCNC: 72 U/L (ref 40–150)
ALT SERPL W P-5'-P-CCNC: 26 U/L (ref 0–50)
ANION GAP SERPL CALCULATED.3IONS-SCNC: 6 MMOL/L (ref 3–14)
AST SERPL W P-5'-P-CCNC: 20 U/L (ref 0–45)
BILIRUB SERPL-MCNC: 0.3 MG/DL (ref 0.2–1.3)
BUN SERPL-MCNC: 8 MG/DL (ref 7–30)
CALCIUM SERPL-MCNC: 9.8 MG/DL (ref 8.5–10.1)
CHLORIDE SERPL-SCNC: 106 MMOL/L (ref 94–109)
CHOLEST SERPL-MCNC: 275 MG/DL
CO2 SERPL-SCNC: 29 MMOL/L (ref 20–32)
CREAT SERPL-MCNC: 0.81 MG/DL (ref 0.52–1.04)
ERYTHROCYTE [DISTWIDTH] IN BLOOD BY AUTOMATED COUNT: 13.8 % (ref 10–15)
GFR SERPL CREATININE-BSD FRML MDRD: 80 ML/MIN/{1.73_M2}
GLUCOSE SERPL-MCNC: 95 MG/DL (ref 70–99)
HCT VFR BLD AUTO: 40.1 % (ref 35–47)
HDLC SERPL-MCNC: 74 MG/DL
HGB BLD-MCNC: 13.3 G/DL (ref 11.7–15.7)
LDLC SERPL CALC-MCNC: 164 MG/DL
MCH RBC QN AUTO: 24.9 PG (ref 26.5–33)
MCHC RBC AUTO-ENTMCNC: 33.2 G/DL (ref 31.5–36.5)
MCV RBC AUTO: 75 FL (ref 78–100)
NONHDLC SERPL-MCNC: 201 MG/DL
PLATELET # BLD AUTO: 381 10E9/L (ref 150–450)
POTASSIUM SERPL-SCNC: 4.3 MMOL/L (ref 3.4–5.3)
PROT SERPL-MCNC: 8.1 G/DL (ref 6.8–8.8)
RBC # BLD AUTO: 5.35 10E12/L (ref 3.8–5.2)
SODIUM SERPL-SCNC: 141 MMOL/L (ref 133–144)
TRIGL SERPL-MCNC: 186 MG/DL
TSH SERPL DL<=0.005 MIU/L-ACNC: 2.36 MU/L (ref 0.4–4)
WBC # BLD AUTO: 7.9 10E9/L (ref 4–11)

## 2019-04-09 PROCEDURE — 87389 HIV-1 AG W/HIV-1&-2 AB AG IA: CPT | Performed by: INTERNAL MEDICINE

## 2019-04-09 PROCEDURE — 84443 ASSAY THYROID STIM HORMONE: CPT | Performed by: INTERNAL MEDICINE

## 2019-04-09 PROCEDURE — 99396 PREV VISIT EST AGE 40-64: CPT | Performed by: INTERNAL MEDICINE

## 2019-04-09 PROCEDURE — 99214 OFFICE O/P EST MOD 30 MIN: CPT | Mod: 25 | Performed by: INTERNAL MEDICINE

## 2019-04-09 PROCEDURE — 80061 LIPID PANEL: CPT | Performed by: INTERNAL MEDICINE

## 2019-04-09 PROCEDURE — 80053 COMPREHEN METABOLIC PANEL: CPT | Performed by: INTERNAL MEDICINE

## 2019-04-09 PROCEDURE — 36415 COLL VENOUS BLD VENIPUNCTURE: CPT | Performed by: INTERNAL MEDICINE

## 2019-04-09 PROCEDURE — G0472 HEP C SCREEN HIGH RISK/OTHER: HCPCS | Performed by: INTERNAL MEDICINE

## 2019-04-09 PROCEDURE — 85027 COMPLETE CBC AUTOMATED: CPT | Performed by: INTERNAL MEDICINE

## 2019-04-09 RX ORDER — VALACYCLOVIR HYDROCHLORIDE 500 MG/1
500 TABLET, FILM COATED ORAL DAILY
Qty: 30 TABLET | Refills: 1 | Status: CANCELLED | OUTPATIENT
Start: 2019-04-09

## 2019-04-09 RX ORDER — CELECOXIB 200 MG/1
CAPSULE ORAL
Qty: 60 CAPSULE | Refills: 3 | Status: CANCELLED | OUTPATIENT
Start: 2019-04-09

## 2019-04-09 RX ORDER — ALBUTEROL SULFATE 90 UG/1
2 AEROSOL, METERED RESPIRATORY (INHALATION) EVERY 4 HOURS PRN
Qty: 18 G | Refills: 11 | Status: SHIPPED | OUTPATIENT
Start: 2019-04-09 | End: 2020-10-28

## 2019-04-09 ASSESSMENT — ASTHMA QUESTIONNAIRES
QUESTION_1 LAST FOUR WEEKS HOW MUCH OF THE TIME DID YOUR ASTHMA KEEP YOU FROM GETTING AS MUCH DONE AT WORK, SCHOOL OR AT HOME: NONE OF THE TIME
QUESTION_5 LAST FOUR WEEKS HOW WOULD YOU RATE YOUR ASTHMA CONTROL: WELL CONTROLLED
QUESTION_2 LAST FOUR WEEKS HOW OFTEN HAVE YOU HAD SHORTNESS OF BREATH: ONCE OR TWICE A WEEK
QUESTION_3 LAST FOUR WEEKS HOW OFTEN DID YOUR ASTHMA SYMPTOMS (WHEEZING, COUGHING, SHORTNESS OF BREATH, CHEST TIGHTNESS OR PAIN) WAKE YOU UP AT NIGHT OR EARLIER THAN USUAL IN THE MORNING: NOT AT ALL
QUESTION_4 LAST FOUR WEEKS HOW OFTEN HAVE YOU USED YOUR RESCUE INHALER OR NEBULIZER MEDICATION (SUCH AS ALBUTEROL): NOT AT ALL
ACT_TOTALSCORE: 23

## 2019-04-09 ASSESSMENT — MIFFLIN-ST. JEOR: SCORE: 1083.91

## 2019-04-09 NOTE — PATIENT INSTRUCTIONS
*  Referral to the Neurosurgery Clinic to evaluate your chronci lower back pain and to see if there is any role for surgery since you have not responded to typical conservative measures.      --[facility] [phone];  [email or web address]     *  Continue all medications at the same doses.  Contact your usual pharmacy if you need refills.     *  Follow up with the Oncology Clinic as planned.     *  Many blood tests today to evalaute for multiple problems.       Preventive Health Recommendations  Female Ages 50 - 64    Yearly exam: See your health care provider every year in order to  o Review health changes.   o Discuss preventive care.    o Review your medicines if your doctor has prescribed any.      Get a Pap test every three years (unless you have an abnormal result and your provider advises testing more often).    If you get Pap tests with HPV test, you only need to test every 5 years, unless you have an abnormal result.     You do not need a Pap test if your uterus was removed (hysterectomy) and you have not had cancer.    You should be tested each year for STDs (sexually transmitted diseases) if you're at risk.     Have a mammogram every 1 to 2 years.    Have a colonoscopy at age 50, or have a yearly FIT test (stool test). These exams screen for colon cancer.      *  Colonoscopy next due 2021    *  Mammogram due after 9/29/19      Have a cholesterol test every 5 years, or more often if advised.    Have a diabetes test (fasting glucose) every three years. If you are at risk for diabetes, you should have this test more often.     If you are at risk for osteoporosis (brittle bone disease), think about having a bone density scan (DEXA).    Shots: Get a flu shot each year. Get a tetanus shot every 10 years.    Nutrition:     Eat at least 5 servings of fruits and vegetables each day.    Eat whole-grain bread, whole-wheat pasta and brown rice  "instead of white grains and rice.    Get adequate Calcium and Vitamin D.        --Good Grains:  Oats, brown rice, Quinoa (these do not raise the blood sugar as much)     --Bad grains:  Anything made from wheat or white rice     (because these raise the blood sugars significantly, and the possible gluten issue from wheat for some people).      --Proteins:  Aim for \"lean proteins\" including chicken, fish, seafood, pork, turkey, and eggs (in moderation); Eat red meat only occasionally     Lifestyle    Exercise at least 150 minutes a week (30 minutes a day, 5 days a week). This will help you control your weight and prevent disease.    Limit alcohol to one drink per day.    No smoking.     Wear sunscreen to prevent skin cancer.     See your dentist every six months for an exam and cleaning.    See your eye doctor every 1 to 2 years.    "

## 2019-04-09 NOTE — PROGRESS NOTES
SUBJECTIVE:   CC: Kimberly Larkin is an 59 year old woman who presents for preventive health visit.     Healthy Habits:     Getting at least 3 servings of Calcium per day:  Yes    Bi-annual eye exam:  Yes    Dental care twice a year:  Yes    Sleep apnea or symptoms of sleep apnea:  None    Diet:  Regular (no restrictions)    Frequency of exercise:  2-3 days/week    Duration of exercise:  15-30 minutes    Taking medications regularly:  Yes    Medication side effects:  None    PHQ-2 Total Score: 0    Additional concerns today:  No          1.  history of lung cancer. Followed by oncology clinic with regular CXRs and CT scans.   Denies shortness of breath, no respiratory complaints.     2.    Asthma stable.  Has not had any recent breathing troubles beyond usual baseline.  Has not any acute respiratory events.  Remains with intermittent cough, mild shortness of breath with overexertion as per usual.  Using medication as directed with reported side effects    ACT Total Scores 4/9/2019   ACT TOTAL SCORE (Goal Greater than or Equal to 20) 23   In the past 12 months, how many times did you visit the emergency room for your asthma without being admitted to the hospital? 0   In the past 12 months, how many times were you hospitalized overnight because of your asthma? 0      3.  Still havingpain in her lower back, occ spreadings into her lower legs and sacroiliac laura.   Had epidural steroid injection with relief of symptoms temporarily.   Reviewed her last MRI showing herniates disc afecting right S1 nerve root.     4.  history of hyperthyroidism.  Treated with oral meds, in remission for last couple of years.   Thyroid labs at endo office have remained stable.   She says she was told to have me recheck and monitor her thyroid labs.   Denies signs and symptoms of thyroif issues.     Lab Results   Component Value Date    TSH 2.36 04/09/2019    TSH 3.07 01/03/2017    TSH <0.01 11/30/2015    TSH  12/30/2014      <0.01  Effective 7/30/2014, the reference range for this assay has changed to reflect   new instrumentation/methodology.      TSH  08/28/2014     <0.01  Effective 7/30/2014, the reference range for this assay has changed to reflect   new instrumentation/methodology.      TSH 1.67 04/14/2014    TSH 1.79 04/08/2013    TSH 2.18 10/13/2011    TSH 3.45 04/23/2010          Today's PHQ-2 Score:   PHQ-2 ( 1999 [employer]) 4/9/2019   Q1: Little interest or pleasure in doing things 0   Q2: Feeling down, depressed or hopeless 0   PHQ-2 Score 0   Q1: Little interest or pleasure in doing things Not at all   Q2: Feeling down, depressed or hopeless Not at all   PHQ-2 Score 0       Abuse: Current or Past(Physical, Sexual or Emotional)- No  Do you feel safe in your environment? Yes    Social History     Tobacco Use     Smoking status: Never Smoker     Smokeless tobacco: Never Used   Substance Use Topics     Alcohol use: No     If you drink alcohol do you typically have >3 drinks per day or >7 drinks per week? No    Alcohol Use 4/9/2019   Prescreen: >3 drinks/day or >7 drinks/week? No   Prescreen: >3 drinks/day or >7 drinks/week? -   No flowsheet data found.    Reviewed orders with patient.  Reviewed health maintenance and updated orders accordingly - Yes      Mammogram Screening: Patient over age 50, mutual decision to screen reflected in health maintenance.    Pertinent mammograms are reviewed under the imaging tab.  History of abnormal Pap smear: Status post benign hysterectomy. Health Maintenance and Surgical History updated.  PAP / HPV Latest Ref Rng & Units 11/24/2015 7/23/2012 4/11/2011   PAP - NIL NIL NIL   HPV 16 DNA NEG Negative - -   HPV 18 DNA NEG Negative - -   OTHER HR HPV NEG Negative - -     Reviewed and updated as needed this visit by clinical staff  Allergies  Meds         Reviewed and updated as needed this visit by Provider          Past Medical History:  ---------------------------  Past Medical History:    Diagnosis Date     Helicobacter pylori gastritis 14    per EGD biopsy; treated with 14 days triple therapy by GI     Hyperthyroidism      Malignant neoplasm of bronchus and lung, unspecified site 3/30/07    Squamous cell carcinoma, left lower lobe lung, surgical stage T2N0M0       Past Surgical History:  ---------------------------  Past Surgical History:   Procedure Laterality Date     C THORACOSCOPY SURG LOBECTOMY  3/30/07    Squamous cell carcinoma, left lower lobe lung, surgical stage T2N0M0     C/SECTION, LOW TRANSVERSE      , Low Transverse x 2     CHOLECYSTECTOMY, LAPOROSCOPIC  11    Cholecystectomy, Laparoscopic with intraoperative cholangiogram     COLONOSCOPY  11    normal colonoscopy     HYSTERECTOMY, PAP NO LONGER INDICATED      LEYDI/BSO for myoma     HYSTERECTOMY, LEYDI      LEYDI/BSO for myoma       Current Medications:  ---------------------------  Current Outpatient Medications   Medication Sig Dispense Refill     albuterol (PROAIR HFA/PROVENTIL HFA/VENTOLIN HFA) 108 (90 Base) MCG/ACT inhaler Inhale 2 puffs into the lungs every 4 hours as needed for shortness of breath / dyspnea or wheezing 18 g 11     CALCIUM 600 + D 600-200 MG-UNIT OR TABS takes one per day 3 MONTHS 1 YEAR     celecoxib (CELEBREX) 200 MG capsule TAKE 1 CAPSULE(200 MG) BY MOUTH TWICE DAILY AS NEEDED FOR MODERATE PAIN 60 capsule 3     estradiol (ESTRACE) 0.5 MG tablet TAKE 1 TABLET BY MOUTH EVERY OTHER DAY 30 tablet 0     fluticasone (ARNUITY ELLIPTA) 100 MCG/ACT inhaler Inhale 1 puff into the lungs daily 3 each 3     fluticasone (FLONASE) 50 MCG/ACT nasal spray Spray 1-2 sprays into both nostrils daily 16 g 2     MULTIVITAMINS OR TABS ONE DAILY 100 1 YEAR     valACYclovir (VALTREX) 500 MG tablet Take 1 tablet (500 mg) by mouth daily 30 tablet 1       Allergies:  -------------  Allergies   Allergen Reactions     Seasonal Allergies      Sneezing, sinus congestion, runny nose       Social  History:  -------------------  Social History     Socioeconomic History     Marital status: Single     Spouse name: Not on file     Number of children: Not on file     Years of education: Not on file     Highest education level: Not on file   Occupational History     Not on file   Social Needs     Financial resource strain: Not on file     Food insecurity:     Worry: Not on file     Inability: Not on file     Transportation needs:     Medical: Not on file     Non-medical: Not on file   Tobacco Use     Smoking status: Never Smoker     Smokeless tobacco: Never Used   Substance and Sexual Activity     Alcohol use: No     Drug use: No     Sexual activity: Not Currently     Partners: Male     Comment: Hysterectomy   Lifestyle     Physical activity:     Days per week: Not on file     Minutes per session: Not on file     Stress: Not on file   Relationships     Social connections:     Talks on phone: Not on file     Gets together: Not on file     Attends Yarsanism service: Not on file     Active member of club or organization: Not on file     Attends meetings of clubs or organizations: Not on file     Relationship status: Not on file     Intimate partner violence:     Fear of current or ex partner: Not on file     Emotionally abused: Not on file     Physically abused: Not on file     Forced sexual activity: Not on file   Other Topics Concern     Parent/sibling w/ CABG, MI or angioplasty before 65F 55M? Not Asked   Social History Narrative     Not on file       Family Medical History:  ------------------------------  Family History   Problem Relation Age of Onset     Hypertension Father      Cerebrovascular Disease Father      C.A.D. Father      Eye Disorder Father         cataract     Diabetes Mother      Family History Negative Brother      Family History Negative Brother      Family History Negative Brother      Family History Negative Sister      Cancer Brother          throat cancer age 36        Review of  "Systems  CONSTITUTIONAL: NEGATIVE for fever, chills, change in weight  INTEGUMENTARY/SKIN: NEGATIVE for worrisome rashes, moles or lesions  EYES: NEGATIVE for vision changes or irritation  ENT: NEGATIVE for ear, mouth and throat problems  RESP: NEGATIVE for significant cough or SOB  BREAST: NEGATIVE for masses, tenderness or discharge  CV: NEGATIVE for chest pain, palpitations or peripheral edema  GI: NEGATIVE for nausea, abdominal pain, heartburn, or change in bowel habits  : NEGATIVE for unusual urinary or vaginal symptoms. No vaginal bleeding.  MUSCULOSKELETAL: NEGATIVE for significant arthralgias or myalgia  NEURO: NEGATIVE for weakness, dizziness or paresthesias  PSYCHIATRIC: NEGATIVE for changes in mood or affect      OBJECTIVE:   /80   Pulse 94   Temp 98.2  F (36.8  C) (Oral)   Ht 1.6 m (5' 3\")   Wt 54 kg (119 lb)   LMP 10/12/2006   SpO2 98%   BMI 21.08 kg/m    Physical Exam  GENERAL alert and no distress  EYES:  Normal sclera,conjunctiva, EOMI  HENT: oral and posterior pharynx without lesions or erythema, facies symmetric  NECK: Neck supple. No LAD, without thyroidmegaly or JVD., Carotids without bruits.  RESP: Clear to ausculation bilaterally without wheezes or crackles. Normal BS in all fields.  CV: RRR normal S1S2 without murmurs, rubs or gallops. PMI normal  LYMPH: no cervical lymph adenopathy appreciated  MS: extremities- no gross deformities of the visible extremities noted, no edema  PSYCH: Alert and oriented times 3; speech- coherent  SKIN:  No obvious significant skin lesions on visible portions of face         ASSESSMENT/PLAN:     (Z00.01) Encounter for routine adult medical exam with abnormal findings  (primary encounter diagnosis)  Comment: Discussed self breast exam, schedule for mammogram.  Discussed importance of regular pelvic exams and PAP smears to check for GYN malignancies.  Discussed cardiac risk factor modification including screening for (and treating if present) " cholesterol, HTN, DM, and avoiding smoking.  Recommended a low fat diet and regular aerobic activity.    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   Her endocrinologist told her to have my recheck her labs annually.   No signs and symptoms of hyperthyoidirsm.   Plan: TSH with free T4 reflex            (M53.3) Sacroiliac pain  Comment: ongoin issue.   Some relief with steroid injections into this area.   Refer to neurosurgery to see if there is any possibility role for surgery for this, otherwise return to phsyiatry, possibly pain clinic  Plan: NEUROSURGERY REFERRAL            (M51.16) Lumbar disc herniation with radiculopathy  Comment:   Plan: NEUROSURGERY REFERRAL            (J45.30) Mild persistent asthma without complication  Comment: This condition is currently controlled on the current medical regimen.  Continue current therapy.  Discussed asthma in detail and discussed how their breathing symptoms and the pattern of the shortness of breath fits with asthma.   Discussed pathophysiology of asthma and treatments based on the degree of symptoms.   Discussed triggers for asthma in general, and those specific to the patient.  Also told them to anticipate potentially more intense coughing and shortness of breath with any URI (regardless of bacterial or viral etiology) and to be prepared to use the albuterol more often if  "needed and to return and see me for any such exacerbation.    I recommended having rescue inhaler available and using all throughout the year as needed, demonstrated proper MDI technique for them.  Emphasized the need for them to let me know if there are any changes for the worse in their breathing related to asthma, either acute or chronic and to seek emergency care if the breathing acutely worsens in a sever manner.     Plan: Lipid panel reflex to direct LDL Fasting,         fluticasone (ARNUITY ELLIPTA) 100 MCG/ACT         inhaler, albuterol (PROAIR HFA/PROVENTIL         HFA/VENTOLIN HFA) 108 (90 Base) MCG/ACT inhaler            (A60.04) Herpes simplex vulvovaginitis  Comment:   Plan:     (A60.00) Recurrent genital HSV (herpes simplex virus) infection  Comment: occ ourtbreaks, valtrex as needed.   Plan:     (Z11.59) Need for hepatitis C screening test  Comment:   Plan: Hepatitis C Screen Reflex to HCV RNA Quant and         Genotype            (Z13.6) CARDIOVASCULAR SCREENING; LDL GOAL LESS THAN 160  Comment: Discussed cardiac disease risk factors and cardiac disease risk factor modification.   Plan: Lipid panel reflex to direct LDL Fasting, CBC         with platelets, Comprehensive metabolic panel            (Z11.4) Screening for HIV (human immunodeficiency virus)  Comment:   Plan: HIV Screening               COUNSELING:      BP Readings from Last 1 Encounters:   04/09/19 120/80     Estimated body mass index is 21.08 kg/m  as calculated from the following:    Height as of this encounter: 1.6 m (5' 3\").    Weight as of this encounter: 54 kg (119 lb).           reports that she has never smoked. She has never used smokeless tobacco.      Counseling Resources:  ATP IV Guidelines  Pooled Cohorts Equation Calculator  Breast Cancer Risk Calculator  FRAX Risk Assessment  ICSI Preventive Guidelines  Dietary Guidelines for Americans, 2010  USDA's MyPlate  ASA Prophylaxis  Lung CA Screening    Gilberto Clifford, " MD  [facility]

## 2019-04-09 NOTE — LETTER
4/10/2019      Kimberly SAMSON Trae  [address] [place] [address]      Dear Ms. Kimberly Larkin:    I am writing to inform you of the results of the laboratory tests you had done recently.    Total Cholesterol:   Lab Results   Component Value Date    CHOL 275 04/09/2019          (Recommended: below 200)  HDL (good) Cholesterol :   Lab Results   Component Value Date    HDL 74 04/09/2019         (Recommended: 40 or more)  LDL (bad) Cholesterol:    Lab Results   Component Value Date     04/09/2019          (Recommended: below 130, below 100 if heart disease or diabetes is diagnosed)  Triglycerides:    Lab Results   Component Value Date    TRIG 186 04/09/2019       (Recommended: below 180)  Non HDL cholesterol (Cholesterol ratio:   Lab Results   Component Value Date    CHOLHDLRATIO 2.7 04/08/2013    NHDL 201 04/09/2019     (Ideally below 130, Acceptable below 160).    Additional results of your recent labs are as noted.  Liver function: NORMAL  Kidney function: NORMAL  Hemoglobin: NORMAL  Thyroid function: NORMAL  Electrolytes: NORMAL  Glucose: NORMAL  Screening test for Hepatitis C: NEGATIVE    Your labs were all within normal limits, except your cholesterol results are mildly elevated, but not high enough to immediately consider a medication.  I would recommend paying more attention to a low fat diet (trying to keep the calories from fat less than 30% of the total calories) and exercising on a more regular basis.  If you are unable to lower the cholesterol through these means, then you may require a medication.    Thank you for allowing me to participate in your care. If you have any further questions or problems, please contact me via our nurse line at [phone]    Sincerely,          Gilberto Clifford M.D.  Department of Internal Medicine  [facility]

## 2019-04-10 LAB
HCV AB SERPL QL IA: NONREACTIVE
HIV 1+2 AB+HIV1 P24 AG SERPL QL IA: NONREACTIVE

## 2019-04-10 ASSESSMENT — ASTHMA QUESTIONNAIRES: ACT_TOTALSCORE: 23

## 2019-09-10 DIAGNOSIS — Z79.890 NEED FOR PROPHYLACTIC HORMONE REPLACEMENT THERAPY (POSTMENOPAUSAL): ICD-10-CM

## 2019-09-10 RX ORDER — ESTRADIOL 0.5 MG/1
TABLET ORAL
Qty: 30 TABLET | Refills: 6 | Status: SHIPPED | OUTPATIENT
Start: 2019-09-10 | End: 2020-10-28

## 2019-09-10 NOTE — TELEPHONE ENCOUNTER
"Requested Prescriptions   Pending Prescriptions Disp Refills     estradiol (ESTRACE) 0.5 MG tablet 30 tablet 0       Hormone Replacement Therapy Passed - 9/10/2019  2:45 PM        Passed - Blood pressure under 140/90 in past 12 months     BP Readings from Last 3 Encounters:   04/09/19 120/80   04/04/18 122/80   09/07/17 122/76                 Passed - Recent (12 mo) or future (30 days) visit within the authorizing provider's specialty     Patient had office visit in the last 12 months or has a visit in the next 30 days with authorizing provider or within the authorizing provider's specialty.  See \"Patient Info\" tab in inbasket, or \"Choose Columns\" in Meds & Orders section of the refill encounter.              Passed - Patient has mammogram in past 2 years on file if age 50-75        Passed - Medication is active on med list        Passed - Patient is 18 years of age or older        Passed - No active pregnancy on record        Passed - No positive pregnancy test on record in past 12 months          "

## 2019-09-10 NOTE — TELEPHONE ENCOUNTER
Requested Prescriptions   Pending Prescriptions Disp Refills     estradiol (ESTRACE) 0.5 MG tablet 30 tablet 0       There is no refill protocol information for this order        Last Written Prescription Date:12/11/2018  Last Fill Quantity: 30,  # refills: 0   Last Office Visit: 4/9/2019   Future Office Visit:

## 2019-10-10 ENCOUNTER — HOSPITAL ENCOUNTER (OUTPATIENT)
Dept: MAMMOGRAPHY | Facility: CLINIC | Age: 59
Discharge: HOME | End: 2019-10-10
Attending: INTERNAL MEDICINE | Admitting: INTERNAL MEDICINE
Payer: COMMERCIAL

## 2019-10-10 DIAGNOSIS — Z12.31 VISIT FOR SCREENING MAMMOGRAM: ICD-10-CM

## 2019-10-10 PROCEDURE — 77067 SCR MAMMO BI INCL CAD: CPT

## 2019-11-11 ENCOUNTER — ANCILLARY PROCEDURE (OUTPATIENT)
Dept: GENERAL RADIOLOGY | Facility: CLINIC | Age: 59
End: 2019-11-11
Attending: PHYSICIAN ASSISTANT
Payer: COMMERCIAL

## 2019-11-11 ENCOUNTER — OFFICE VISIT (OUTPATIENT)
Dept: NEUROSURGERY | Facility: CLINIC | Age: 59
End: 2019-11-11
Attending: PHYSICIAN ASSISTANT
Payer: COMMERCIAL

## 2019-11-11 ENCOUNTER — TELEPHONE (OUTPATIENT)
Dept: INTERNAL MEDICINE | Facility: CLINIC | Age: 59
End: 2019-11-11

## 2019-11-11 VITALS
HEART RATE: 91 BPM | OXYGEN SATURATION: 96 % | BODY MASS INDEX: 22.82 KG/M2 | HEIGHT: 62 IN | WEIGHT: 124 LBS | TEMPERATURE: 98 F | DIASTOLIC BLOOD PRESSURE: 81 MMHG | SYSTOLIC BLOOD PRESSURE: 129 MMHG

## 2019-11-11 DIAGNOSIS — M53.3 SACROILIAC PAIN: ICD-10-CM

## 2019-11-11 DIAGNOSIS — J45.30 MILD PERSISTENT ASTHMA WITHOUT COMPLICATION: ICD-10-CM

## 2019-11-11 DIAGNOSIS — M51.16 LUMBAR DISC HERNIATION WITH RADICULOPATHY: ICD-10-CM

## 2019-11-11 PROCEDURE — 72220 X-RAY EXAM SACRUM TAILBONE: CPT

## 2019-11-11 PROCEDURE — 72100 X-RAY EXAM L-S SPINE 2/3 VWS: CPT

## 2019-11-11 PROCEDURE — 99204 OFFICE O/P NEW MOD 45 MIN: CPT | Performed by: PHYSICIAN ASSISTANT

## 2019-11-11 PROCEDURE — G0463 HOSPITAL OUTPT CLINIC VISIT: HCPCS

## 2019-11-11 ASSESSMENT — MIFFLIN-ST. JEOR: SCORE: 1090.71

## 2019-11-11 ASSESSMENT — PAIN SCALES - GENERAL: PAINLEVEL: SEVERE PAIN (7)

## 2019-11-11 NOTE — PATIENT INSTRUCTIONS
-Order placed for lumbar and sacral imaging. You can schedule at our  today, or you can call [facility] at [phone] (south). We will call you with the results and next steps once imaging is completed.    - Order placed for physical therapy at [facility]       [facility]  Phone: [phone]  Fax: [phone]

## 2019-11-11 NOTE — NURSING NOTE
"Kimberly Larkin is a 59 year old female who presents for:  Chief Complaint   Patient presents with     Consult For     sacroilliac pain/lumbar disc herniation with radiculopathy..         Initial Vitals:  /81 (BP Location: Right arm, Patient Position: Sitting, Cuff Size: Adult Large)   Pulse 91   Temp 98  F (36.7  C) (Oral)   Ht 5' 2\" (1.575 m)   Wt 124 lb (56.2 kg)   LMP 10/12/2006   SpO2 96%   BMI 22.68 kg/m   Estimated body mass index is 22.68 kg/m  as calculated from the following:    Height as of this encounter: 5' 2\" (1.575 m).    Weight as of this encounter: 124 lb (56.2 kg).. Body surface area is 1.57 meters squared. BP completed using cuff size: large  Severe Pain (7)        Nursing Comments: Patient states left sided pain by tailbone and left sided upper leg pain.         Jeanne Rodriguez, ALEX    "

## 2019-11-11 NOTE — TELEPHONE ENCOUNTER
PA Initiation    Medication: fluticasone (ARNUITY ELLIPTA) 100 MCG/ACT inhaler - INITIATED  Insurance Company: [employer] [place] - Phone [phone] Fax [phone]  Pharmacy Filling the Rx: [id number] - [place], [place] - [address] & [facility]  Filling Pharmacy Phone: [phone]  Filling Pharmacy Fax: [phone]  Start Date: 11/11/2019    Central Prior Authorization Team   Phone: [phone]

## 2019-11-11 NOTE — LETTER
"    11/11/2019         RE: Kimberly Larkin  [address]  [place] [address]        Dear Colleague,    Thank you for referring your patient, Kimberly Larkin, to the [facility]. Please see a copy of my visit note below.      Neurosurgery Clinic Consult    Ms. Larkin is a 59-year-old female who reports longstanding low back pain primarily off to the left towards her sacroiliac joint.  She has been evaluated for this in the past and was offered some type of surgical procedure in 2014 but declined.  She has been managing with activity limitation and had done physician neck and back physical therapy in the past.  She reports in March 2019 she fell on her buttocks and the pain is been exasperated exacerbated since then.  She has not had any updated imaging her last MRI was in 2017 of her lumbar spine.  She denies any radicular symptoms going down her legs.  Denies any weakness.    Medical history  Noncontributory    Social history  Noncontributory      /81 (BP Location: Right arm, Patient Position: Sitting, Cuff Size: Adult Large)   Pulse 91   Temp 98  F (36.7  C) (Oral)   Ht 5' 2\" (1.575 m)   Wt 124 lb (56.2 kg)   LMP 10/12/2006   SpO2 96%   BMI 22.68 kg/m         Exam    Alert and oriented no acute distress  Bilateral lower extremities with 5-5 strength  Reflexes 2+ patella and ankle  Negative ankle clonus negative Babinski  Lumbar spine nontender to palpation in the midline mild tenderness to palpation and towards the left SI joint  Gait is normal able to stand on heels and toes negative straight leg raise  No pain with internal or external rotation of the hips bilaterally total time of 45 minutes spent a consultation    Imaging    No recent imaging for evaluation, lumbar MRI from 2017 does not show any obvious cause for left-sided low back pain    Assessment    59-year-old female with left-sided low back/SI pain not radiating into her leg exacerbated by a fall 8 months " ago.    Plan:      We will order lumbar and sacral x-rays AP and lateral and follow-up with the patient regarding the results.  Of also placed a referral to [facility] clinic for physical therapy.  If the x-rays are unremarkable she should proceed with physical therapy.  If that does not help her symptoms she can certainly return to clinic and we would consider repeating a lumbar MRI.    Total time 45 minutes spent in consultation    Again, thank you for allowing me to participate in the care of your patient.        Sincerely,        Sana Eddy PA-C

## 2019-11-11 NOTE — TELEPHONE ENCOUNTER
Prior Authorization Retail Medication Request    Medication/Dose: arnuity ellipta 100mcg oral inh 30  ICD code (if different than what is on RX):  J45.30  Previously Tried and Failed:    Rationale:      Insurance Name:  [employer] [facility] [phone]  Insurance ID: [id number]       Pharmacy Information (if different than what is on RX)  Name:  [facility]  Phone:  [phone]

## 2019-11-12 ENCOUNTER — TELEPHONE (OUTPATIENT)
Dept: NEUROSURGERY | Facility: CLINIC | Age: 59
End: 2019-11-12

## 2019-11-12 RX ORDER — BUDESONIDE AND FORMOTEROL FUMARATE DIHYDRATE 160; 4.5 UG/1; UG/1
2 AEROSOL RESPIRATORY (INHALATION) 2 TIMES DAILY
Qty: 3 INHALER | Refills: 3 | Status: SHIPPED | OUTPATIENT
Start: 2019-11-12 | End: 2020-10-28

## 2019-11-12 NOTE — TELEPHONE ENCOUNTER
PRIOR AUTHORIZATION DENIED    Medication: fluticasone (ARNUITY ELLIPTA) 100 MCG/ACT inhaler - DENIED    Denial Date: 11/12/2019    Denial Rational: Patient must have a history of trial and failure to the formulary alternatives or have a contraindication or intolerance to the formulary alternatives    Appeal Information: Eligible for appeal within 60 days of this notice

## 2019-11-12 NOTE — TELEPHONE ENCOUNTER
Per Sana Eddy, let her know that her x-rays did not show any fractures and that she should proceed with PT and physician [facility] back clinic. If she is not improving with PT she should reach out to follow up. Called and LVM with patient regarding results above. Instructed to call back at earliest convenience.

## 2019-11-12 NOTE — PROGRESS NOTES
"  Neurosurgery Clinic Consult    Ms. Larkin is a 59-year-old female who reports longstanding low back pain primarily off to the left towards her sacroiliac joint.  She has been evaluated for this in the past and was offered some type of surgical procedure in 2014 but declined.  She has been managing with activity limitation and had done physician neck and back physical therapy in the past.  She reports in March 2019 she fell on her buttocks and the pain is been exasperated exacerbated since then.  She has not had any updated imaging her last MRI was in 2017 of her lumbar spine.  She denies any radicular symptoms going down her legs.  Denies any weakness.    Medical history  Noncontributory    Social history  Noncontributory      /81 (BP Location: Right arm, Patient Position: Sitting, Cuff Size: Adult Large)   Pulse 91   Temp 98  F (36.7  C) (Oral)   Ht 5' 2\" (1.575 m)   Wt 124 lb (56.2 kg)   LMP 10/12/2006   SpO2 96%   BMI 22.68 kg/m        Exam    Alert and oriented no acute distress  Bilateral lower extremities with 5-5 strength  Reflexes 2+ patella and ankle  Negative ankle clonus negative Babinski  Lumbar spine nontender to palpation in the midline mild tenderness to palpation and towards the left SI joint  Gait is normal able to stand on heels and toes negative straight leg raise  No pain with internal or external rotation of the hips bilaterally total time of 45 minutes spent a consultation    Imaging    No recent imaging for evaluation, lumbar MRI from 2017 does not show any obvious cause for left-sided low back pain    Assessment    59-year-old female with left-sided low back/SI pain not radiating into her leg exacerbated by a fall 8 months ago.    Plan:      We will order lumbar and sacral x-rays AP and lateral and follow-up with the patient regarding the results.  Of also placed a referral to physician neck and back clinic for physical therapy.  If the x-rays are unremarkable she should " proceed with physical therapy.  If that does not help her symptoms she can certainly return to clinic and we would consider repeating a lumbar MRI.    Total time 45 minutes spent in consultation

## 2019-11-12 NOTE — TELEPHONE ENCOUNTER
Kimberly returned our call to discuss her results. I conveyed the message below to Kimberly and she acknowledged understanding of the results and the plan.

## 2019-12-05 DIAGNOSIS — M53.3 SACROILIAC PAIN: ICD-10-CM

## 2019-12-05 RX ORDER — CELECOXIB 200 MG/1
CAPSULE ORAL
Qty: 60 CAPSULE | Refills: 3 | Status: SHIPPED | OUTPATIENT
Start: 2019-12-05 | End: 2020-05-04

## 2019-12-05 NOTE — TELEPHONE ENCOUNTER
"Requested Prescriptions     Pending Prescriptions Disp Refills     celecoxib (CELEBREX) 200 MG capsule  Last Written Prescription Date:  04/04/2019  Last Fill Quantity: 60,  # refills: 03   Last Office Visit: 4/9/2019   Future Office Visit:      60 capsule 3     Sig: TAKE 1 CAPSULE(200 MG) BY MOUTH TWICE DAILY AS NEEDED FOR MODERATE PAIN       NSAID Medications Failed - 12/5/2019  9:57 AM        Failed - Normal CBC on file in past 12 months     Recent Labs   Lab Test 04/09/19  1519   WBC 7.9   RBC 5.35*   HGB 13.3   HCT 40.1                    Passed - Blood pressure under 140/90 in past 12 months     BP Readings from Last 3 Encounters:   11/11/19 129/81   04/09/19 120/80   04/04/18 122/80                 Passed - Normal ALT on file in past 12 months     Recent Labs   Lab Test 04/09/19  1519   ALT 26             Passed - Normal AST on file in past 12 months     Recent Labs   Lab Test 04/09/19  1519   AST 20             Passed - Recent (12 mo) or future (30 days) visit within the authorizing provider's specialty     Patient has had an office visit with the authorizing provider or a provider within the authorizing providers department within the previous 12 mos or has a future within next 30 days. See \"Patient Info\" tab in inbasket, or \"Choose Columns\" in Meds & Orders section of the refill encounter.              Passed - Patient is age 6-64 years        Passed - Medication is active on med list        Passed - No active pregnancy on record        Passed - Normal serum creatinine on file in past 12 months     Recent Labs   Lab Test 04/09/19  1519   CR 0.81             Passed - No positive pregnancy test in past 12 months          "

## 2020-03-04 DIAGNOSIS — A60.04 HERPES SIMPLEX VULVOVAGINITIS: ICD-10-CM

## 2020-03-04 DIAGNOSIS — A60.00 RECURRENT GENITAL HSV (HERPES SIMPLEX VIRUS) INFECTION: ICD-10-CM

## 2020-03-04 RX ORDER — VALACYCLOVIR HYDROCHLORIDE 500 MG/1
500 TABLET, FILM COATED ORAL DAILY
Qty: 30 TABLET | Refills: 1 | Status: SHIPPED | OUTPATIENT
Start: 2020-03-04 | End: 2020-09-30

## 2020-03-04 NOTE — TELEPHONE ENCOUNTER
"Requested Prescriptions   Pending Prescriptions Disp Refills     valACYclovir (VALTREX) 500 MG tablet 30 tablet 1     Sig: Take 1 tablet (500 mg) by mouth daily       Antivirals for Herpes Protocol Passed - 3/4/2020 11:08 AM        Passed - Patient is age 12 or older        Passed - Recent (12 mo) or future (30 days) visit within the authorizing provider's specialty     Patient has had an office visit with the authorizing provider or a provider within the authorizing providers department within the previous 12 mos or has a future within next 30 days. See \"Patient Info\" tab in inbasket, or \"Choose Columns\" in Meds & Orders section of the refill encounter.              Passed - Medication is active on med list        Passed - Normal serum creatinine on file in past 12 months     Recent Labs   Lab Test 04/09/19  1519   CR 0.81               "

## 2020-03-04 NOTE — TELEPHONE ENCOUNTER
Requested Prescriptions   Pending Prescriptions Disp Refills     valACYclovir (VALTREX) 500 MG tablet 30 tablet 1     Sig: Take 1 tablet (500 mg) by mouth daily       There is no refill protocol information for this order      Last Written Prescription Date:  04/04/19  Last Fill Quantity: 30,  # refills: 1   Last office visit: 4/9/2019 with prescribing provider:  04/09/19   Future Office Visit:  0

## 2020-05-04 DIAGNOSIS — M53.3 SACROILIAC PAIN: ICD-10-CM

## 2020-05-04 RX ORDER — CELECOXIB 200 MG/1
CAPSULE ORAL
Qty: 60 CAPSULE | Refills: 3 | Status: SHIPPED | OUTPATIENT
Start: 2020-05-04 | End: 2020-10-28

## 2020-09-29 DIAGNOSIS — A60.04 HERPES SIMPLEX VULVOVAGINITIS: ICD-10-CM

## 2020-09-29 DIAGNOSIS — A60.00 RECURRENT GENITAL HSV (HERPES SIMPLEX VIRUS) INFECTION: ICD-10-CM

## 2020-09-29 NOTE — LETTER
[facility]  [address] [id number] [phone]            Kimberly Larkin  [address]  [facility] [address]        September 30, 2020    Dear Kimberly,    While refilling your prescription today, we noticed that you are due for an appointment with your provider, this can be done in person or virtually.  We will refill your prescription for 30 days, but a follow-up appointment must be made before any additional refills can be approved.     Taking care of your health is important to us and we look forward to seeing you in the near future.  Please call us at [phone] or [phone] (or use [employer]) to schedule an appointment.     Please disregard this notice if you have already made an appointment.    Sincerely,        [facility]

## 2020-09-30 RX ORDER — VALACYCLOVIR HYDROCHLORIDE 500 MG/1
500 TABLET, FILM COATED ORAL DAILY
Qty: 30 TABLET | Refills: 0 | Status: SHIPPED | OUTPATIENT
Start: 2020-09-30 | End: 2020-10-28

## 2020-10-28 ENCOUNTER — OFFICE VISIT (OUTPATIENT)
Dept: INTERNAL MEDICINE | Facility: CLINIC | Age: 60
End: 2020-10-28
Payer: COMMERCIAL

## 2020-10-28 ENCOUNTER — TELEPHONE (OUTPATIENT)
Dept: INTERNAL MEDICINE | Facility: CLINIC | Age: 60
End: 2020-10-28

## 2020-10-28 VITALS
SYSTOLIC BLOOD PRESSURE: 138 MMHG | HEART RATE: 85 BPM | OXYGEN SATURATION: 98 % | DIASTOLIC BLOOD PRESSURE: 88 MMHG | HEIGHT: 62 IN | TEMPERATURE: 97.8 F | BODY MASS INDEX: 22.36 KG/M2 | WEIGHT: 121.5 LBS

## 2020-10-28 DIAGNOSIS — A60.00 RECURRENT GENITAL HSV (HERPES SIMPLEX VIRUS) INFECTION: ICD-10-CM

## 2020-10-28 DIAGNOSIS — Z78.0 ASYMPTOMATIC MENOPAUSAL STATE: ICD-10-CM

## 2020-10-28 DIAGNOSIS — C33 MALIGNANT NEOPLASM OF TRACHEA, BRONCHUS, AND LUNG (H): ICD-10-CM

## 2020-10-28 DIAGNOSIS — E05.90 HYPERTHYROIDISM: ICD-10-CM

## 2020-10-28 DIAGNOSIS — A60.04 HERPES SIMPLEX VULVOVAGINITIS: ICD-10-CM

## 2020-10-28 DIAGNOSIS — Z00.00 ROUTINE GENERAL MEDICAL EXAMINATION AT A HEALTH CARE FACILITY: Primary | ICD-10-CM

## 2020-10-28 DIAGNOSIS — Z13.6 CARDIOVASCULAR SCREENING; LDL GOAL LESS THAN 160: ICD-10-CM

## 2020-10-28 DIAGNOSIS — M53.3 SACROILIAC PAIN: ICD-10-CM

## 2020-10-28 DIAGNOSIS — C34.80 MALIGNANT NEOPLASM OF TRACHEA, BRONCHUS, AND LUNG (H): ICD-10-CM

## 2020-10-28 DIAGNOSIS — J45.30 MILD PERSISTENT ASTHMA WITHOUT COMPLICATION: ICD-10-CM

## 2020-10-28 DIAGNOSIS — Z79.890 NEED FOR PROPHYLACTIC HORMONE REPLACEMENT THERAPY (POSTMENOPAUSAL): ICD-10-CM

## 2020-10-28 LAB
ALT SERPL W P-5'-P-CCNC: 24 U/L (ref 0–50)
ANION GAP SERPL CALCULATED.3IONS-SCNC: 1 MMOL/L (ref 3–14)
AST SERPL W P-5'-P-CCNC: 18 U/L (ref 0–45)
BUN SERPL-MCNC: 12 MG/DL (ref 7–30)
CALCIUM SERPL-MCNC: 9.1 MG/DL (ref 8.5–10.1)
CHLORIDE SERPL-SCNC: 108 MMOL/L (ref 94–109)
CHOLEST SERPL-MCNC: 279 MG/DL
CO2 SERPL-SCNC: 31 MMOL/L (ref 20–32)
CREAT SERPL-MCNC: 0.78 MG/DL (ref 0.52–1.04)
ERYTHROCYTE [DISTWIDTH] IN BLOOD BY AUTOMATED COUNT: 13.8 % (ref 10–15)
GFR SERPL CREATININE-BSD FRML MDRD: 83 ML/MIN/{1.73_M2}
GLUCOSE SERPL-MCNC: 89 MG/DL (ref 70–99)
HCT VFR BLD AUTO: 40.7 % (ref 35–47)
HDLC SERPL-MCNC: 69 MG/DL
HGB BLD-MCNC: 13.2 G/DL (ref 11.7–15.7)
LDLC SERPL CALC-MCNC: 171 MG/DL
MCH RBC QN AUTO: 24.6 PG (ref 26.5–33)
MCHC RBC AUTO-ENTMCNC: 32.4 G/DL (ref 31.5–36.5)
MCV RBC AUTO: 76 FL (ref 78–100)
NONHDLC SERPL-MCNC: 210 MG/DL
PLATELET # BLD AUTO: 371 10E9/L (ref 150–450)
POTASSIUM SERPL-SCNC: 4.3 MMOL/L (ref 3.4–5.3)
RBC # BLD AUTO: 5.36 10E12/L (ref 3.8–5.2)
SODIUM SERPL-SCNC: 140 MMOL/L (ref 133–144)
TRIGL SERPL-MCNC: 194 MG/DL
TSH SERPL DL<=0.005 MIU/L-ACNC: 2.96 MU/L (ref 0.4–4)
WBC # BLD AUTO: 5.7 10E9/L (ref 4–11)

## 2020-10-28 PROCEDURE — 36415 COLL VENOUS BLD VENIPUNCTURE: CPT | Performed by: INTERNAL MEDICINE

## 2020-10-28 PROCEDURE — 80061 LIPID PANEL: CPT | Performed by: INTERNAL MEDICINE

## 2020-10-28 PROCEDURE — 85027 COMPLETE CBC AUTOMATED: CPT | Performed by: INTERNAL MEDICINE

## 2020-10-28 PROCEDURE — 84460 ALANINE AMINO (ALT) (SGPT): CPT | Performed by: INTERNAL MEDICINE

## 2020-10-28 PROCEDURE — 80048 BASIC METABOLIC PNL TOTAL CA: CPT | Performed by: INTERNAL MEDICINE

## 2020-10-28 PROCEDURE — 99213 OFFICE O/P EST LOW 20 MIN: CPT | Mod: 25 | Performed by: INTERNAL MEDICINE

## 2020-10-28 PROCEDURE — 84450 TRANSFERASE (AST) (SGOT): CPT | Performed by: INTERNAL MEDICINE

## 2020-10-28 PROCEDURE — 84443 ASSAY THYROID STIM HORMONE: CPT | Performed by: INTERNAL MEDICINE

## 2020-10-28 PROCEDURE — 99396 PREV VISIT EST AGE 40-64: CPT | Performed by: INTERNAL MEDICINE

## 2020-10-28 RX ORDER — ESTRADIOL 0.5 MG/1
0.5 TABLET ORAL EVERY OTHER DAY
Qty: 45 TABLET | Refills: 3 | Status: SHIPPED | OUTPATIENT
Start: 2020-10-28 | End: 2021-04-02

## 2020-10-28 RX ORDER — VALACYCLOVIR HYDROCHLORIDE 500 MG/1
500 TABLET, FILM COATED ORAL DAILY
Qty: 90 TABLET | Refills: 3 | Status: SHIPPED | OUTPATIENT
Start: 2020-10-28 | End: 2021-11-10

## 2020-10-28 RX ORDER — ALBUTEROL SULFATE 90 UG/1
2 AEROSOL, METERED RESPIRATORY (INHALATION) EVERY 4 HOURS PRN
Qty: 18 G | Refills: 11 | Status: SHIPPED | OUTPATIENT
Start: 2020-10-28 | End: 2021-11-10

## 2020-10-28 RX ORDER — CELECOXIB 200 MG/1
200 CAPSULE ORAL DAILY PRN
Qty: 90 CAPSULE | Refills: 3 | Status: SHIPPED | OUTPATIENT
Start: 2020-10-28 | End: 2021-08-03

## 2020-10-28 ASSESSMENT — MIFFLIN-ST. JEOR: SCORE: 1074.37

## 2020-10-28 NOTE — TELEPHONE ENCOUNTER
Reason for Call: Request for an order or referral:    Order or referral being requested: dexa    Date needed: as soon as possible    Has the patient been seen by the PCP for this problem? YES    Additional comments: Pt saw Dr Clifford 10/28/20.  It was discussed that the pt should have a dexa.  There isn't an order in her chart.    Phone number Patient can be reached at:  Home number on file [phone] (home)    Best Time:  anytime    Can we leave a detailed message on this number?  YES    Call taken on 10/28/2020 at 3:10 PM by KAREN KEMP

## 2020-10-28 NOTE — TELEPHONE ENCOUNTER
Yes, there is an order in the chart, I placed it in the visit.     Check the orders tab.    Close encounter when done.

## 2020-10-28 NOTE — PATIENT INSTRUCTIONS
"*  Mammogram as schedule next week    *  You are due for a bone density scan (DEXA).  Schedule at the [facility] at your convenience.     *  Colonoscopy for colon cancer screening due next year, it was normal in 2021.      *  Use the albuterol inhaler as needed for any coughing, wheezing, or shortness of breath.     *  Look into whether or not you received the second Shingrix shingles vaccine or not.  The only one we have on record was the one from May 2018.     *  Follow up with the Oncology Clinic as planned this November.    *  Return to see me in 1 year, sooner if needed.  Use [employer] or Call [phone] to schedule the appointment with me.       5 GOALS TO PREVENT VASCULAR DISEASE:     1.  Aggressive blood pressure control, under 130/80 ideally.  Using medications if needed.    Your blood pressure is under good control    BP Readings from Last 4 Encounters:   10/28/20 138/88   11/11/19 129/81   04/09/19 120/80   04/04/18 122/80       2.  Aggressive LDL cholesterol (\"bad cholesterol\") lowering as indicated.    Your goal is an LDL under 130 for sure, preferably under 100.  (If you have diabetes or previous vascular disease, the the LDL goals would be under 100 for sure, preferably under 70.)    New guidelines identify four high-risk groups who could benefit from statins:   *people with pre-existing heart disease, such as those who have had a heart attack;   *people ages 40 to 75 who have diabetes of any type  *patients ages 40 to 75 with at least a 7.5% risk of developing cardiovascular disease over the next decade, according to a formula described in the guidelines  *patients with the sort of super-high cholesterol that sometimes runs in families, as evidenced by an LDL of 190 milligrams per deciliter or higher    Your cholesterol levels are well controlled.    Recent Labs   Lab Test 04/09/19  1519 04/08/13  1215   CHOL 275* 211*   HDL 74 77   * 118   TRIG 186* 77   CHOLHDLRATIO  --  2.7 "       3.  Aggressive diabetic prevention, screening and/or management.      You do not have diabetes as of the most recent blood tests.     4.  No smoking    5.  Consider daily preventative aspirin over age 50 if you have enough cardiac risk factors to place you at higher risk for the presence of vascular disease.    If you have any reason not to take aspirin such easy bruising or bleeding, stomach problems, other anticoagulant medications, or any other side effects, then you should not take Aspirin.      --Based on your relative lack of current cardiac risk factor profile, you do NOT need to take daily preventative aspirin.      Preventive Health Recommendations  Female Ages 50 - 64    Yearly exam: See your health care provider every year in order to  o Review health changes.   o Discuss preventive care.    o Review your medicines if your doctor has prescribed any.      Get a Pap test every three years (unless you have an abnormal result and your provider advises testing more often).    If you get Pap tests with HPV test, you only need to test every 5 years, unless you have an abnormal result.     You do not need a Pap test if your uterus was removed (hysterectomy) and you have not had cancer.    You should be tested each year for STDs (sexually transmitted diseases) if you're at risk.     Have a mammogram every 1 to 2 years.    Have a colonoscopy at age 50, or have a yearly FIT test (stool test). These exams screen for colon cancer.      Have a cholesterol test every 5 years, or more often if advised.    Have a diabetes test (fasting glucose) every three years. If you are at risk for diabetes, you should have this test more often.     If you are at risk for osteoporosis (brittle bone disease), think about having a bone density scan (DEXA).    Shots: Get a flu shot each year. Get a tetanus shot every 10 years.    Nutrition:     Eat at least 5 servings of fruits and vegetables each day.    Eat whole-grain bread,  "whole-wheat pasta and brown rice instead of white grains and rice.    Get adequate Calcium and Vitamin D.        --Good Grains:  Oats, brown rice, Quinoa (these do not raise the blood sugar as much)     --Bad grains:  Anything made from wheat or white rice     (because these raise the blood sugars significantly, and the possible gluten issue from wheat for some people).      --Proteins:  Aim for \"lean proteins\" including chicken, fish, seafood, pork, turkey, and eggs (in moderation); Eat red meat only occasionally        Lifestyle    Exercise at least 150 minutes a week (30 minutes a day, 5 days a week). This will help you control your weight and prevent disease.    Limit alcohol to one drink per day.    No smoking.     Wear sunscreen to prevent skin cancer.     See your dentist every six months for an exam and cleaning.    See your eye doctor every 1 to 2 years.        ASTHMA:               *ALBUTEROL:  Use the Albuterol inhaler or albuterol nebulizer as needed for any coughing, wheezing, tightness in the breathing, or shortness of breath.   Consider using it before any known triggers for our coughing or wheezing (usually these include cold or hot temperatures, humidity, dust, air pollutants, smoke).  Expect that your airways may remain more easily irritated for a few weeks after upper respiratory infections.     If you require the albuterol rescue inhaler on a regular basis more than a few times per week, you may need additional medications to help control the breathing better.    These are the available forms of Albuterol, your insurance formulary will determine which one is preferred.  They all work the same.                 "

## 2020-10-28 NOTE — PROGRESS NOTES
SUBJECTIVE:   CC: Kimberly Larkin is an 60 year old woman who presents for preventive health visit.       Patient has been advised of split billing requirements and indicates understanding: Yes  Healthy Habits:     Getting at least 3 servings of Calcium per day:  Yes    Bi-annual eye exam:  Yes    Dental care twice a year:  Yes    Sleep apnea or symptoms of sleep apnea:  None    Diet:  Regular (no restrictions)    Frequency of exercise:  2-3 days/week    Duration of exercise:  15-30 minutes    Taking medications regularly:  Yes    Medication side effects:  None    PHQ-2 Total Score: 1    Additional concerns today:  No      1.    Asthma stable.  Has not had any recent breathing troubles beyond usual baseline.  Has not any recent acute respiratory events.  Using medication as directed with reported side effects    *Reports that she stopped using Symbicort several months ago and has not had any adverse effects since that time.    Uses albuterol rarely.    ACT Total Scores 4/9/2019 10/28/2020   ACT TOTAL SCORE (Goal Greater than or Equal to 20) 23 21   In the past 12 months, how many times did you visit the emergency room for your asthma without being admitted to the hospital? 0 0   In the past 12 months, how many times were you hospitalized overnight because of your asthma? 0 0        2.  History of previous lung cancer, status post lobectomy.  Has regular follow-up with oncology clinic with CAT scans on a regular annual basis.  She is due to be seen again in November.    Denies unintended weight loss, denies specific shortness of breath.  No hemoptysis, no night sweats, no unexplained fevers.    3.  History of hyperthyroidism, treated.  Was followed previously by endocrinology clinic for this however her labs remained very stable for the last several years requiring no treatment.  Endocrinologist recommended she have this checked annually at her primary care office.  She denies weight loss or weight changes or  hair or skin changes.      Reviewed labs:    Lab Results   Component Value Date    TSH 2.36 04/09/2019    TSH 3.07 01/03/2017    TSH <0.01 11/30/2015    TSH  12/30/2014     <0.01  Effective 7/30/2014, the reference range for this assay has changed to reflect   new instrumentation/methodology.      TSH  08/28/2014     <0.01  Effective 7/30/2014, the reference range for this assay has changed to reflect   new instrumentation/methodology.      TSH 1.67 04/14/2014    TSH 1.79 04/08/2013    TSH 2.18 10/13/2011    TSH 3.45 04/23/2010        4.  Has semiregular outbreaks of herpes, taking Valtrex every day without side effects.          Today's PHQ-2 Score:   PHQ-2 ( 1999 Pfizer) 10/28/2020   Q1: Little interest or pleasure in doing things 1   Q2: Feeling down, depressed or hopeless 0   PHQ-2 Score 1   Q1: Little interest or pleasure in doing things Several days   Q2: Feeling down, depressed or hopeless Not at all   PHQ-2 Score 1       Abuse: Current or Past (Physical, Sexual or Emotional) - No  Do you feel safe in your environment? Yes    Have you ever done Advance Care Planning? (For example, a Health Directive, POLST, or a discussion with a medical provider or your loved ones about your wishes): Yes, patient states has an Advance Care Planning document and will bring a copy to the clinic.    Social History     Tobacco Use     Smoking status: Never Smoker     Smokeless tobacco: Never Used   Substance Use Topics     Alcohol use: No         Alcohol Use 10/28/2020   Prescreen: >3 drinks/day or >7 drinks/week? No   Prescreen: >3 drinks/day or >7 drinks/week? -   No flowsheet data found.    Reviewed orders with patient.  Reviewed health maintenance and updated orders accordingly - Yes      Mammogram: Has been receiving annual mammograms, neck scheduled in 1 week    Pertinent mammograms are reviewed under the imaging tab.  History of abnormal Pap smear: Status post benign hysterectomy. Health Maintenance and Surgical History  "updated.  PAP / HPV Latest Ref Rng & Units 11/24/2015 7/23/2012 4/11/2011   PAP - NIL NIL NIL   HPV 16 DNA NEG Negative - -   HPV 18 DNA NEG Negative - -   OTHER HR HPV NEG Negative - -     Reviewed and updated as needed this visit by clinical staff  Tobacco  Allergies  Meds     [facility]          Reviewed and updated as needed this visit by Provider        [facility]             I reviewed the information recorded in the patient's EPIC chart (including but not limited to medical history, surgical history, problem list, medication list, and allergy list) and updated information as needed.         Review of Systems  CONSTITUTIONAL: NEGATIVE for fever, chills, change in weight  INTEGUMENTARY/SKIN: NEGATIVE for worrisome rashes, moles or lesions  EYES: NEGATIVE for vision changes or irritation  ENT: NEGATIVE for ear, mouth and throat problems  RESP: NEGATIVE for significant cough or SOB  BREAST: NEGATIVE for masses, tenderness or discharge  CV: NEGATIVE for chest pain, palpitations or peripheral edema  GI: NEGATIVE for nausea, abdominal pain, heartburn, or change in bowel habits  : NEGATIVE for unusual urinary or vaginal symptoms. No vaginal bleeding.  MUSCULOSKELETAL: NEGATIVE for significant arthralgias or myalgia  NEURO: NEGATIVE for weakness, dizziness or paresthesias  PSYCHIATRIC: NEGATIVE for changes in mood or affect      OBJECTIVE:   /88   Pulse 85   Temp 97.8  F (36.6  C) (Temporal)   Ht 1.575 m (5' 2\")   Wt 55.1 kg (121 lb 8 oz)   LMP 10/12/2006   SpO2 98%   BMI 22.22 kg/m    Physical Exam  GENERAL alert and no distress  EYES:  Normal sclera,conjunctiva, EOMI  HENT: oral and posterior pharynx without lesions or erythema, facies symmetric  NECK: Neck supple. No LAD, without thyroidmegaly.  RESP: Clear to ausculation bilaterally without wheezes or crackles. Normal BS in all fields.  CV: RRR normal S1S2 without murmurs, rubs or gallops.  LYMPH: no cervical lymph adenopathy appreciated  MS: " extremities- no gross deformities of the visible extremities noted,   EXT:  no lower extremity edema  PSYCH: Alert and oriented times 3; speech- coherent  SKIN:  No obvious significant skin lesions on visible portions of face     Diagnostic Test Results:  Labs reviewed in Epic    ASSESSMENT/PLAN:     (Z00.00) Routine general medical examination at a health care facility  (primary encounter diagnosis)  Comment: Discussed cardiac disease risk factor modification including screening, preventing, and treating hypertension, elevated lipids, diabetes, and smoking cessation.    Discussed age appropriate cancer screening recommendations as dictated by age group and past medical history.    Recommended making better food choices as often as possible, including lower carb, lower fat, lower salt diet and moderation in any alcohol intake.    Recommended maintaining regular physical activity/exercise throughout their lifetime.  Recommended safety and injury prevention (I.e. seatbelt use, safety equipment like helmets when biking, etc).    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  Plan:     (J45.30) Mild persistent asthma without complication  Comment: Stopped the controller inhaler several months ago without any significant changes in her respiratory status.  Will defer continuing controller inhaler at this time, reconsider if she has more regular respiratory troubles.  Strongly recommend she use albuterol as needed for any coughing wheezing or change in breathing.  If she has symptoms that would make her think about using albuterol more than a few times a week, she should probably reconsider the controller medication.  Plan: albuterol (PROAIR HFA/PROVENTIL HFA/VENTOLIN         HFA) 108 (90 Base) MCG/ACT inhaler            (E05.90)  Hyperthyroidism  Comment: Labs have remained stable for the last 5 years.  No need to see the endocrinology clinic as long as labs remained stable.  We will continue to monitor this once yearly.  Will refer back to endocrinology if the  Thyroid labs become abnormal.  Plan: TSH with free T4 reflex            (A60.04) Herpes simplex vulvovaginitis  Comment: Continue Valtrex every day for suppression.  Plan: valACYclovir (VALTREX) 500 MG tablet            (A60.00) Recurrent genital HSV (herpes simplex virus) infection  Comment:   Plan: valACYclovir (VALTREX) 500 MG tablet            (M53.3) Sacroiliac pain  Comment: Has been doing well with physical therapy and regular to size.  Reviewed neurosurgery note from last fall.  Plan: celecoxib (CELEBREX) 200 MG capsule            (Z78.0) Asymptomatic menopausal state  Comment: Last bone density 2012 was normal.  She should recheck the bone density/DEXA scan sometime in the next year.  Advised that she continue calcium supplementation 1200 mg once daily and aim for at least 1000 units of vitamin D every day.  Plan: DX Hip/Pelvis/Spine            (Z13.6) CARDIOVASCULAR SCREENING; LDL GOAL LESS THAN 160  Comment: Discussed cardiac disease risk factors and cardiac disease risk factor modification.   LDL cholesterol elevated in 2019, not indicative of a medication at this time.    Plan: CBC with platelets, Basic metabolic panel, AST,        ALT, Lipid panel reflex to direct LDL Fasting            (Z79.890) Need for prophylactic hormone replacement therapy (postmenopausal)  Comment:   Plan: estradiol (ESTRACE) 0.5 MG tablet               Patient has been advised of split billing requirements and indicates understanding: Yes  COUNSELING:  Reviewed preventive health counseling, as reflected in patient instructions       Regular exercise       Healthy diet/nutrition       Vision screening       Hearing screening       Immunizations    Vaccinate for Shingrix May 2018, I find no record  "of receiving a second Shingrix vaccine.  She is fairly certain that she received a second shot at [facility].  Asked her to get those records so we can put them in her chart.  If she did not receive the second Shingrix vaccine, then she should consider getting it.           Osteoporosis Prevention/Bone Health       Colon cancer screening, last colonoscopy was normal in 2011, due next in 2021.    Estimated body mass index is 22.22 kg/m  as calculated from the following:    Height as of this encounter: 1.575 m (5' 2\").    Weight as of this encounter: 55.1 kg (121 lb 8 oz).        She reports that she has never smoked. She has never used smokeless tobacco.      Counseling Resources:  ATP IV Guidelines  Pooled Cohorts Equation Calculator  Breast Cancer Risk Calculator  BRCA-Related Cancer Risk Assessment: FHS-7 Tool  FRAX Risk Assessment  ICSI Preventive Guidelines  Dietary Guidelines for Americans, 2010  USDA's MyPlate  ASA Prophylaxis  Lung CA Screening    Gilberto Clifford MD  [facility]  "

## 2020-10-28 NOTE — LETTER
"11/2/2020      Kimberly SAMSON Trae  [address] [place] [address]      Dear Ms. Kimberly Larkin:    I am writing to inform you of the results of the laboratory tests you had done recently.    Total Cholesterol:   Lab Results   Component Value Date    CHOL 279 10/28/2020          (Recommended: below 200)  HDL (good) Cholesterol :   Lab Results   Component Value Date    HDL 69 10/28/2020         (Recommended: 40 or more)  LDL (bad) Cholesterol:    Lab Results   Component Value Date     10/28/2020          (Recommended: below 130, below 100 if heart disease or diabetes is diagnosed)  Triglycerides:    Lab Results   Component Value Date    TRIG 194 10/28/2020       (Recommended: below 180)  Non HDL cholesterol (Cholesterol ratio:   Lab Results   Component Value Date    CHOLHDLRATIO 2.7 04/08/2013    NHDL 210 10/28/2020     (Ideally below 130, Acceptable below 160).    Additional results of your recent labs are as noted.  Liver function: NORMAL  Kidney function: NORMAL  Hemoglobin: NORMAL  Thyroid function: NORMAL  Electrolytes: NORMAL  Glucose: NORMAL    Your labs all looked good (including normal blood counts, kidney function, blood glucose levels, thyroid, and liver), except the LDL (\"bad\") cholesterol was higher than desired, however not high enough to warrant any medication.  I would simply try and continue to make the better food choices.      Thank you for allowing me to participate in your care. If you have any further questions or problems, please contact me via our nurse line at [phone]    Sincerely,          Gilberto Clifford M.D.  Department of Internal Medicine  [facility]   "

## 2020-10-29 ASSESSMENT — ASTHMA QUESTIONNAIRES: ACT_TOTALSCORE: 21

## 2020-11-03 ENCOUNTER — HOSPITAL ENCOUNTER (OUTPATIENT)
Dept: MAMMOGRAPHY | Facility: CLINIC | Age: 60
Discharge: HOME | End: 2020-11-03
Attending: INTERNAL MEDICINE | Admitting: INTERNAL MEDICINE
Payer: COMMERCIAL

## 2020-11-03 DIAGNOSIS — Z12.31 VISIT FOR SCREENING MAMMOGRAM: ICD-10-CM

## 2020-11-03 PROCEDURE — 77067 SCR MAMMO BI INCL CAD: CPT

## 2020-11-04 ENCOUNTER — ANCILLARY PROCEDURE (OUTPATIENT)
Dept: BONE DENSITY | Facility: CLINIC | Age: 60
End: 2020-11-04
Attending: INTERNAL MEDICINE
Payer: COMMERCIAL

## 2020-11-04 DIAGNOSIS — Z78.0 ASYMPTOMATIC MENOPAUSAL STATE: ICD-10-CM

## 2020-11-04 PROCEDURE — 77080 DXA BONE DENSITY AXIAL: CPT | Performed by: INTERNAL MEDICINE

## 2021-01-12 ENCOUNTER — TELEPHONE (OUTPATIENT)
Dept: INTERNAL MEDICINE | Facility: CLINIC | Age: 61
End: 2021-01-12

## 2021-01-12 NOTE — TELEPHONE ENCOUNTER
Reason for Call:  Form, our goal is to have forms completed with 72 hours, however, some forms may require a visit or additional information.    Type of letter, form or note:  medical    Who is the form from?: please create     Where did the form come from: please create     What clinic location was the form placed at?: please create     Where the form was placed: please create     What number is listed as a contact on the form?: [phone] (home) [phone] (work)       Additional comments: Pt is requesting a letter from PCP stating Pt's comorbidities to use when the general public has access to the vaccine. Trying to get ahead of the wave in case they need it. Is not requesting advance vaccination at this time. Please mail to Pt once complete. Address verified.    Call taken on 1/12/2021 at 12:56 PM by Rosalba Vicente

## 2021-01-12 NOTE — TELEPHONE ENCOUNTER
Mailing problem list to patients home address. Informed her to contact us if she needs more documentation.    HAI Claros

## 2021-01-20 ENCOUNTER — APPOINTMENT (OUTPATIENT)
Dept: GENERAL RADIOLOGY | Facility: CLINIC | Age: 61
End: 2021-01-20
Attending: PHYSICIAN ASSISTANT
Payer: COMMERCIAL

## 2021-01-20 ENCOUNTER — HOSPITAL ENCOUNTER (EMERGENCY)
Facility: CLINIC | Age: 61
Discharge: HOME | End: 2021-01-20
Attending: PHYSICIAN ASSISTANT | Admitting: PHYSICIAN ASSISTANT
Payer: COMMERCIAL

## 2021-01-20 VITALS
SYSTOLIC BLOOD PRESSURE: 150 MMHG | DIASTOLIC BLOOD PRESSURE: 80 MMHG | RESPIRATION RATE: 20 BRPM | WEIGHT: 120 LBS | BODY MASS INDEX: 21.95 KG/M2 | HEART RATE: 63 BPM | OXYGEN SATURATION: 98 % | TEMPERATURE: 98 F
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
## 2021-01-20 DIAGNOSIS — S52.501A CLOSED FRACTURE OF DISTAL END OF RIGHT RADIUS, UNSPECIFIED FRACTURE MORPHOLOGY, INITIAL ENCOUNTER: ICD-10-CM

## 2021-01-20 PROCEDURE — 96375 TX/PRO/DX INJ NEW DRUG ADDON: CPT

## 2021-01-20 PROCEDURE — 96374 THER/PROPH/DIAG INJ IV PUSH: CPT

## 2021-01-20 PROCEDURE — 25605 CLTX DST RDL FX/EPHYS SEP W/: CPT | Mod: RT

## 2021-01-20 PROCEDURE — 999N000065 XR WRIST RT G/E 3 VW: Mod: RT

## 2021-01-20 PROCEDURE — 99285 EMERGENCY DEPT VISIT HI MDM: CPT | Mod: 25

## 2021-01-20 PROCEDURE — 250N000011 HC RX IP 250 OP 636: Performed by: PHYSICIAN ASSISTANT

## 2021-01-20 PROCEDURE — 73110 X-RAY EXAM OF WRIST: CPT | Mod: RT

## 2021-01-20 RX ORDER — OXYCODONE HYDROCHLORIDE 5 MG/1
2.5-5 TABLET ORAL EVERY 6 HOURS PRN
Qty: 8 TABLET | Refills: 0 | Status: SHIPPED | OUTPATIENT
Start: 2021-01-20 | End: 2021-01-20

## 2021-01-20 RX ORDER — ONDANSETRON 2 MG/ML
4 INJECTION INTRAMUSCULAR; INTRAVENOUS ONCE
Status: COMPLETED | OUTPATIENT
Start: 2021-01-20 | End: 2021-01-20

## 2021-01-20 RX ORDER — HYDROMORPHONE HYDROCHLORIDE 1 MG/ML
0.5 INJECTION, SOLUTION INTRAMUSCULAR; INTRAVENOUS; SUBCUTANEOUS ONCE
Status: COMPLETED | OUTPATIENT
Start: 2021-01-20 | End: 2021-01-20

## 2021-01-20 RX ADMIN — ONDANSETRON 4 MG: 2 INJECTION INTRAMUSCULAR; INTRAVENOUS at 18:35

## 2021-01-20 RX ADMIN — HYDROMORPHONE HYDROCHLORIDE 0.5 MG: 1 INJECTION, SOLUTION INTRAMUSCULAR; INTRAVENOUS; SUBCUTANEOUS at 18:35

## 2021-01-20 ASSESSMENT — ENCOUNTER SYMPTOMS: ARTHRALGIAS: 1

## 2021-01-21 NOTE — ED PROVIDER NOTES
History   Chief Complaint:  Wrist Pain       HPI   Kimberly Larkin is a 61 year old female with history of hyperthyroidism and lung cancer who presents with right wrist pain. The patient slipped on ice tonight and landed on her right wrist, and her wrist is now deformed. She does not have pain in her right elbow and did not sustain any other injuries. She did not hit her head or lose consciousness.    Review of Systems   Musculoskeletal: Positive for arthralgias (right wrist pain).   All other systems reviewed and are negative.      Allergies:  No Known Drug Allergies     Medications:  Albuterol inhaler  Celebrex  Estradiol tablet  Valtrex    Past Medical History:    H. Pylori  Hyperthyroidism  Malignant neoplasm of bronchus and lung  Asthma  GERD    Past Surgical History:    Lobectomy   x2  Cholecystectomy  Colonoscopy  LEYDI/BSO     Family History:    Hypertension   CAD, father  Cataract  Diabetes     Social History:   The patient presents alone. .    Physical Exam     Patient Vitals for the past 24 hrs:   BP Temp Pulse Resp SpO2 Weight   21 (!) 150/80 -- 82 -- 99 % --   21 1900 (!) 152/83 -- 66 -- 97 % --   21 1845 (!) 151/78 -- -- -- 99 % --   21 1840 (!) 151/78 -- 67 -- 100 % --   21 1820 (!) 142/79 98  F (36.7  C) 86 20 100 % 54.4 kg (120 lb)       Physical Exam  CV: 2+ radial pulse, brisk distal cap refill  Pulm: No respiratory distress  MSK:   Right Wrist exam: tenderness to right wrist with obvious deformity.  No tenderness palpation throughout the remaining hand or finger bones.  The finger flexors (FDS/FDP) and extensors are intact. There are no sensory deficits, Median, Ulnar, and Radial nerve function is normal. Radial artery pulsations are normal. Capillary refill is normal.     No tenderness to the remainder of the right upper extremity and able to fully flex/extend elbow. No midline cervical, thoracic, lumbar tenderness.   Neurological: Alert,  attentive. See MSK exam.   Skin: Skin is warm and dry.   Psych: Normal mood and affect     Emergency Department Course     Imaging:  @1954 XR, Wrist Right, G/E 3 Views  IMPRESSION: There is a mild to moderately impacted transverse fracture distal radial metaphysis with a vertical intra-articular extension of the fracture. Mild dorsal angulation of the distal fragment. No other fracture identified.  Reading per radiology.      @2029 XR, Wrist Right, G/E 3 Views                      IMPRESSION: Comminuted mildly displaced intra-articular fracture of the distal right radius. There is very mild dorsal angulation of the distal radial fracture fragments. No evidence of additional fractures.  Reading per radiology.     Emergency Department Course:    Reviewed:  I reviewed nursing notes, vitals and past medical history    Assessments:  1821 I obtained history and examined the patient as noted above.   2046 I rechecked the patient and explained findings.     Interventions:  1835 Dilaudid 0.5 mg IV  1835 Zofran 4 mg IV     Disposition:  The patient was discharged to home.       Impression & Plan     Medical Decision Making:  Kimberly Larkin is a 61 year old female who presents with right wrist injury after a slip and fall.  Denies other injuries and joint below and after non-tender and full ROM without pain. Radiograph reveals a comminuted mildly displaced distal radial fracture.  Dr. Bray also evaluated this patient and completed hematoma block for pain control and patient was placed in finger tourniquets. Reduction of the fracture completed and post reduction x-rays reveal improved displacement. Please see Dr. Bray's note for further details.  CMS intact prior and after splint placement.     Plan for discharge home with close follow-up with orthopedist. I recommended calling orthopedic tomorrow to schedule follow-up appointment.  Recommended Tylenol/ibuprofen as needed for pain, rest, and elevation.  Discussed prescription  for narcotic pain medication and patient declined.  Return precautions discussed including numbness/tingling, weakness, acutely worsening pain, or any other new/concerning symptoms.  Patient agrees with this plan all questions and concerns addressed prior to discharge home.      Covid-19  Kimberly Larkin was evaluated during a global COVID-19 pandemic, which necessitated consideration that the patient might be at risk for infection with the SARS-CoV-2 virus that causes COVID-19.   Applicable protocols for evaluation were followed during the patient's care.    Diagnosis:    ICD-10-CM    1. Closed fracture of distal end of right radius, unspecified fracture morphology, initial encounter  S52.501A    2. Fall, initial encounter  W19.XXXA        Discharge Medications:  Current Discharge Medication List          Scribe Disclosure:  I, Katlin Breaux, am serving as a scribe at 6:16 PM on 1/20/2021 to document services personally performed by Yamilka Summers PA-C based on my observations and the provider's statements to me.        Yamilka Summers PA-C  01/20/21 1821

## 2021-01-21 NOTE — DISCHARGE INSTRUCTIONS
DO NOT get splint wet    Signs splint is too tight -->         -uncontrolled pain,         -new significant swelling in fingers/toes        -numbness in fingers/toes        -fingers/toes turn white/blue/purple    * If any of these happen, RETURN to ER as we may need to remove your splint and reapply    * Elevate your injured extremity above level of your heart when able while awake and use ice packs 15-20 minutes every hour while awake for first 48 hrs to help minimize swelling    Use tylenol and/or ibuprofen for pain or discomfort    Use Oxycodone for severe pain uncontrolled by above medications.           Discharge Instructions  Splint Care    You had a splint put on today to help protect your injury and help it heal.  Splints are used to treat things like strains, sprains, large cuts, and fractures (broken bones). Splints are temporary and are designed to allow for swelling.    Be sure your splint is not too tight!  If you splint is too tight, it may cause loss of blood supply.  Signs of your splint being too tight include: your arm or leg hurting a lot more; your fingers or toes getting numb, cold, pale or blue; or your child is crying, fussing or seeming restless.    Generally, every Emergency Department visit should have a follow-up clinic visit with either a primary or a specialty clinic/provider. Please follow-up as instructed by your emergency provider today.  Return to the Emergency Department right away if:  You have increased pain or pressure around the injury.  You have numbness, tingling, or cool, pale, or blue toes or fingers past the injury.  Your child is more fussy than normal, crying a lot, or restless.  Your splint becomes soft, breaks, or is wet.  Your splint begins to smell bad.  Your splint is cutting into your skin.    Home care:  Keep the injured area above the level of your heart while laying or sitting down.  This will help decrease the swelling and the pain.  Keep the splint dry.  Do not  put objects down or inside the splint.  If there is an elastic bandage (Ace  wrap) holding the splint on this may be loosened slightly to relieve pressure or pain.  If pain continues return to the Emergency Department right away.  Do not remove your splint by yourself unless told to by your provider.    Follow-up:  Sometimes the splint put on in the Emergency Department needs to be changed once the swelling has gone down and a more permanent cast needs to be placed.  This is usually done by a bone specialist provider (Orthopedist).  Follow the instructions given to you by your provider today.    If you were given a prescription for medicine here today, be sure to read all of the information (including the package insert) that comes with your prescription.  This will include important information about the medicine, its side effects, and any warnings that you need to know about.  The pharmacist who fills the prescription can provide more information and answer questions you may have about the medicine.  If you have questions or concerns that the pharmacist cannot address, please call or return to the Emergency Department.     Remember that you can always come back to the Emergency Department if you are not able to see your regular provider in the amount of time listed above, if you get any new symptoms, or if there is anything that worries you.

## 2021-01-21 NOTE — ED PROVIDER NOTES
Emergency Department Attending Supervision Note  1/20/2021  6:45 PM    I evaluated this patient in conjunction with Yamilka Summers PA-C    Briefly, the patient presented with a fall onto an outstretched hand.  She has pain in her right wrist with a deformity.  She denies hitting her head.  She denies any pain elsewhere.    Physical Exam  General: Well-nourished, no acute distress  Eyes: PERRL, conjunctivae pink no scleral icterus or conjunctival injection  ENT:  Moist mucus membranes  Respiratory:  No respiratory distress  CV: Normal rate.  Symmetric and normal radial pulses.  Normal distal capillary refill.  Skin: Warm, dry.  No rashes or petechiae.  No lacerations.  Musculoskeletal: Deformity at distal radius.  No tenderness over the remainder of the arm, elbow or hand.  Neuro: Alert and oriented to person/place/time.  Normal distal sensation to light touch.  Able to flex and extend at all fingers of the hand.  Psychiatric: Normal affect      Results  Imaging Results  XR Wrist Right G/E 3 Views  IMPRESSION: There is a mild to moderately impacted transverse fracture distal radial metaphysis with a vertical intra-articular extension of the fracture. Mild dorsal angulation of the distal fragment. No other fracture identified.    Procedures  Procedure Note: Hematoma block:  PROCEDURE: Hematoma block right distal radius fracture    INDICATION:  Fracture distal radius    PHYSICIAN:  Yamilka Bray MD   DESCRIPTION OF PROCEDURE:    Informed consent. Site was correctly identified.  Skin anesthesia was obtained with 0.5 cc of 1% lidocaine without epinephrine, local infiltration.  Using a 18 gauge needle in standard fashion the distal radius fracture site was entered and 7cc of lidocaine introduced.   Patient tolerated the procedure well, no complications.       Narrative: Procedure: Reduction       Location: right wrist     Consent:  Risks, benefits and alternatives were discussed with patient and consent for procedure  was obtained.     Timeout:  Universal protocol was followed. TIME OUT conducted just prior to starting procedure confirmed patient identity, site/side, procedure, patient position, and availability of correct equipment and implants? Yes      Medication:  Lidocaine: Hematoma block     Procedure Note: Patient was placed in finger traps and arm was hung.  5 pound weight was applied.  After 15 minutes, the wrist was reduced with gentle pressure.  A plaster splint was applied by myself.  Alignment was near anatomic.  Patient was neurovascularly intact after.     Patient Status:  Patient tolerated the procedure well.  There were no complications.         Reverse Sugar Tong Plaster Splint Placement     Splint was applied and after placement I checked and adjusted the fit to ensure proper positioning. Patient was more comfortable with splint in place. Sensation and circulation are intact after splint placement.    ED course  Reviewed:  1843 I reviewed the patient's nursing notes, vitals, past medical records, Care Everywhere.     Assessments:  1850 I physically examined the patient as documented above and performed the hematoma block as documented above. I placed the patient in finger traps.  2010 I performed the splint placement as documented above.    Impression  Kimberly Larkin is a 61 year old female who fell on an outstretched hand and sustained distal radius fracture.  I agree with the medical decision making of GERMAN Summers.  Anesthesia was obtained with a hematoma block.  We able to obtain near-anatomic alignment with the reduction.  She was splinted.  She is neurovascularly intact.  She may need surgery and she needs to follow-up with orthopedics in the next week.  No signs of compartment syndrome but she was alerted to signs and symptoms to watch for.  With reasonable clinical confidence, I feel she safe for discharge home at this time.      Diagnosis    ICD-10-CM    1. Closed fracture of distal end of right  radius, unspecified fracture morphology, initial encounter  S52.501A    2. Fall, initial encounter  W19.XXXA        Scribe Disclosure:  I, Saw Bhagat, am serving as a scribe at 6:45 PM on January 20, 2021 to document services personally performed by Yamilka Bray MD based on my observations and the provider's statements to me. '       Yamilka Bray MD  01/20/21 3267

## 2021-02-12 ENCOUNTER — MYC MEDICAL ADVICE (OUTPATIENT)
Dept: INTERNAL MEDICINE | Facility: CLINIC | Age: 61
End: 2021-02-12

## 2021-02-14 ENCOUNTER — HEALTH MAINTENANCE LETTER (OUTPATIENT)
Age: 61
End: 2021-02-14

## 2021-02-15 NOTE — TELEPHONE ENCOUNTER
For the most up-to-date information regarding Covid vaccination, check with the [facility] website ([email or web address]) or [place] Department of Health website.  This is a fluid situation, with rapidly changing variables, mostly dictated by available vaccine supply.

## 2021-03-11 ENCOUNTER — IMMUNIZATION (OUTPATIENT)
Dept: NURSING | Facility: CLINIC | Age: 61
End: 2021-03-11
Payer: COMMERCIAL

## 2021-03-11 PROCEDURE — 91300 PR COVID VAC PFIZER DIL RECON 30 MCG/0.3 ML IM: CPT

## 2021-03-11 PROCEDURE — 0001A PR COVID VAC PFIZER DIL RECON 30 MCG/0.3 ML IM: CPT

## 2021-03-30 DIAGNOSIS — Z79.890 NEED FOR PROPHYLACTIC HORMONE REPLACEMENT THERAPY (POSTMENOPAUSAL): ICD-10-CM

## 2021-04-01 ENCOUNTER — IMMUNIZATION (OUTPATIENT)
Dept: NURSING | Facility: CLINIC | Age: 61
End: 2021-04-01
Attending: FAMILY MEDICINE
Payer: COMMERCIAL

## 2021-04-01 PROCEDURE — 0002A PR COVID VAC PFIZER DIL RECON 30 MCG/0.3 ML IM: CPT

## 2021-04-01 PROCEDURE — 91300 PR COVID VAC PFIZER DIL RECON 30 MCG/0.3 ML IM: CPT

## 2021-04-02 RX ORDER — ESTRADIOL 0.5 MG/1
0.5 TABLET ORAL EVERY OTHER DAY
Qty: 45 TABLET | Refills: 3 | Status: SHIPPED | OUTPATIENT
Start: 2021-04-02

## 2021-05-06 ENCOUNTER — OFFICE VISIT (OUTPATIENT)
Dept: OBGYN | Facility: CLINIC | Age: 61
End: 2021-05-06
Payer: COMMERCIAL

## 2021-05-06 VITALS
WEIGHT: 120 LBS | HEIGHT: 62 IN | SYSTOLIC BLOOD PRESSURE: 118 MMHG | BODY MASS INDEX: 22.08 KG/M2 | DIASTOLIC BLOOD PRESSURE: 80 MMHG

## 2021-05-06 DIAGNOSIS — Z01.419 ENCOUNTER FOR GYNECOLOGICAL EXAMINATION WITHOUT ABNORMAL FINDING: Primary | ICD-10-CM

## 2021-05-06 DIAGNOSIS — N95.2 VAGINAL ATROPHY: ICD-10-CM

## 2021-05-06 PROCEDURE — 99386 PREV VISIT NEW AGE 40-64: CPT | Performed by: OBSTETRICS & GYNECOLOGY

## 2021-05-06 RX ORDER — ESTRADIOL 0.1 MG/G
1 CREAM VAGINAL
Qty: 42.5 G | Refills: 2 | Status: SHIPPED | OUTPATIENT
Start: 2021-05-06 | End: 2022-04-21

## 2021-05-06 ASSESSMENT — MIFFLIN-ST. JEOR: SCORE: 1062.57

## 2021-05-06 NOTE — PROGRESS NOTES
Gynecology Clinic Visit:    SUBJECTIVE:     Kimberly is a 61 year old  who presents for annual exam and remove of HRT. She underwent total abdominal hysterectomy bilateral salpingo-oophorectomy 2006 for fibroid uterus. Was put on estrace 0.5mg QoD, now using it only once weekly for assistance with hot flashes, mood and vaginal dryness, No vaginal bleeding noted. Still has mild vaginal dryness and occasional discomfort with intercourse.     GYNECOLOGIC HISTORY:  She is sexually active with 1 male partner.    Family history of breast CA: No  Family history of uterine/ovarian CA: No  Family history of colon CA: No    HEALTH MAINTENANCE:  Last Mammogram: 2017 . History of abnormal Mammo: No  Pap; (  Lab Results   Component Value Date    PAP NIL, HPV negative 2015    PAP NIL 2012    PAP NIL 2011     HISTORY:  Patient Active Problem List   Diagnosis     Leiomyoma of uterus     Anemia     Malignant neoplasm of trachea, bronchus, and lung (H)     Low back pain     CARDIOVASCULAR SCREENING; LDL GOAL LESS THAN 160     Advanced directives, counseling/discussion     Lumbar disc herniation with radiculopathy     Backache     Routine general medical examination at a health care facility     Encounter for routine gynecological examination     Gastroesophageal reflux disease without esophagitis     Hyperthyroidism     HSV-2 seropositive     Mild persistent asthma without complication     Past Surgical History:   Procedure Laterality Date     C THORACOSCOPY SURG LOBECTOMY  3/30/07    Squamous cell carcinoma, left lower lobe lung, surgical stage T2N0M0     C/SECTION, LOW TRANSVERSE      , Low Transverse x 2     CHOLECYSTECTOMY, LAPOROSCOPIC  11    Cholecystectomy, Laparoscopic with intraoperative cholangiogram     COLONOSCOPY  11    normal colonoscopy     HYSTERECTOMY, PAP NO LONGER INDICATED      LEYDI/BSO for myoma     HYSTERECTOMY, LEYDI      LEYDI/BSO for myoma      Social History      Tobacco Use     Smoking status: Never Smoker     Smokeless tobacco: Never Used   Substance Use Topics     Alcohol use: No      Problem (# of Occurrences) Relation (Name,Age of Onset)    C.A.D. (1) Father    Cancer (1) Brother:  throat cancer age 36    Cerebrovascular Disease (1) Father    Diabetes (1) Mother    Eye Disorder (1) Father: cataract    Family History Negative (4) Brother, Brother, Brother, Sister    Hypertension (1) Father              OB History    Para Term  AB Living   2 2 2 0 0 2   SAB TAB Ectopic Multiple Live Births   0 0 0 0 0      # Outcome Date GA Lbr Ernst/2nd Weight Sex Delivery Anes PTL Lv   2 Term            1 Term              Past Medical History:   Diagnosis Date     Helicobacter pylori gastritis 14    per EGD biopsy; treated with 14 days triple therapy by GI     Hyperthyroidism      Malignant neoplasm of bronchus and lung, unspecified site 3/30/07    Squamous cell carcinoma, left lower lobe lung, surgical stage T2N0M0         Current Outpatient Medications:      CALCIUM 600 + D 600-200 MG-UNIT OR TABS, takes one per day, Disp: 3 MONTHS, Rfl: 1 YEAR     celecoxib (CELEBREX) 200 MG capsule, Take 1 capsule (200 mg) by mouth daily as needed for moderate pain, Disp: 90 capsule, Rfl: 3     estradiol (ESTRACE) 0.5 MG tablet, Take 1 tablet (0.5 mg) by mouth every other day, Disp: 45 tablet, Rfl: 3     MULTIVITAMINS OR TABS, ONE DAILY, Disp: 100, Rfl: 1 YEAR     albuterol (PROAIR HFA/PROVENTIL HFA/VENTOLIN HFA) 108 (90 Base) MCG/ACT inhaler, Inhale 2 puffs into the lungs every 4 hours as needed for shortness of breath / dyspnea or wheezing (Patient not taking: Reported on 2021), Disp: 18 g, Rfl: 11     valACYclovir (VALTREX) 500 MG tablet, Take 1 tablet (500 mg) by mouth daily (Patient not taking: Reported on 2021), Disp: 90 tablet, Rfl: 3  Allergies   Allergen Reactions     Seasonal Allergies      Sneezing, sinus congestion, runny nose     OBJECTIVE:  "    EXAM:  /80 (BP Location: Left arm, Patient Position: Chair, Cuff Size: Adult Regular)   Ht 1.575 m (5' 2\")   Wt 54.4 kg (120 lb)   LMP 10/12/2006   Breastfeeding No   BMI 21.95 kg/m   Body mass index is 21.95 kg/m .   Constitutional: healthy, alert and no distress  Head: Normocephalic. No lesions or abnormalities  Neck: Neck supple. Trachea midline.  Breast: No visible masses or suspicious skin changes.  No discrete or dominant masses to palpation.  No axillary lymphadenopathy.  Gastrointestinal: Abdomen soft, non-tender, non-distended. No masses, organomegaly  : External genitalia normal healthy tissue with atrophy of the vulva, normal architecture.  No obvious excoriations, lesions, or rashes. Bartholins, urethra, skeins normal.  Pale vaginal mucosa with areas of hyperemia.  SSE: uterus and cervix surgically absent.  Bimanual: cuff intact, No adnexal masses or tenderness appreciated.    Ovaries:  No masses appreciated, non-tender, mobile  Musculoskeletal: extremities normal  Skin: no suspicious lesions or rashes  Psychiatric: Affect appropriate, cooperative, mentation appears normal.          ASSESSMENT/PLAN:                                                    Kimberly Larkin is a 61 year old female  with satisfactory annual exam and concern for vaginal discharge.    PLAN:  Dx:  1)  Pap smears no longer indicated s/p hysterectomy with no history of CHARLETTE II or greater abnormality.  2)  Mammogram: due 2021  3)  Estrogen replacement: reviewed risks and benefits. Using 1/2 of 1 tab weekly with good results.  4) Vaginal atrophy: will start estrace cream for dryness and dyspareunia.    PE:  Reviewed health maintenance including diet, regular exercise   and periodic exams.    Return to clinic in 1 year. Prefers to continue with breast and pelvic exams through Gyn.    COUNSELING:   Reviewed preventive health counseling, as reflected in patient instructions   reports that she has never smoked. She " has never used smokeless tobacco.    Kellee Diaz MD   Obstetrics and Gynecology

## 2021-05-06 NOTE — NURSING NOTE
"Chief Complaint   Patient presents with     Physical       Initial /80 (BP Location: Left arm, Patient Position: Chair, Cuff Size: Adult Regular)   Ht 1.575 m (5' 2\")   Wt 54.4 kg (120 lb)   LMP 10/12/2006   Breastfeeding No   BMI 21.95 kg/m   Estimated body mass index is 21.95 kg/m  as calculated from the following:    Height as of this encounter: 1.575 m (5' 2\").    Weight as of this encounter: 54.4 kg (120 lb).  BP completed using cuff size: regular    Questioned patient about current smoking habits.  Pt. has never smoked.          The following HM Due: NONE    Marina Larkin CMA    "

## 2021-05-06 NOTE — PATIENT INSTRUCTIONS
Vaginal Atrophy    Vaginal atrophy is a normal part of the aging process and is a reaction to decreased circulating estrogen, but this doesn't mean that there is nothing we can do about it. Symptoms often include vaginal dryness, itching, pain with intercourse, and narrowing of the vagina.     For dryness you can start by trying Replens, or another over the counter vaginal moisturizer.    Try a silicone or oil based lubricant for intercourse to make it more comfortable. If you want to talk with an expert on lubricants try visiting the [employer] on [facility] in [facility] where they have a large variety of lubricants to try.   You can purchase lubricants at www.[employer] or amazon.com if they are hard to find elsewhere.    Read about sexual issues after menopause at www.[employer]    Vaginal estrogen is a medication inserted into the vagina which can decrease dryness, itching, narrowing, and pain with intercourse. It is administered in very low doses and is locally active. The risks associated with vaginal estrogen are far fewer and less significant than those associated with systemic hormone replacement therapy.     Options include the following:    - Vagifem tablet, inserted vaginally daily for 14 days then 2-3x weekly  - Estring, a ring placed inside the vagina that rests at the top of the vagina for 3 months  - Estrace cream, a cream that is applied daily for 14 days then 2x/week inside the vagina. Apply 0.5g.  Place cream on your finger and smear inside the vagina nightly before bed for 14 days, then twice weekly.

## 2021-09-19 ENCOUNTER — HEALTH MAINTENANCE LETTER (OUTPATIENT)
Age: 61
End: 2021-09-19

## 2021-10-28 ENCOUNTER — ALLIED HEALTH/NURSE VISIT (OUTPATIENT)
Dept: INTERNAL MEDICINE | Facility: CLINIC | Age: 61
End: 2021-10-28
Payer: COMMERCIAL

## 2021-10-28 DIAGNOSIS — Z23 NEED FOR PNEUMOCOCCAL VACCINATION: Primary | ICD-10-CM

## 2021-10-28 DIAGNOSIS — Z23 NEED FOR TD VACCINE: ICD-10-CM

## 2021-10-28 PROCEDURE — 90714 TD VACC NO PRESV 7 YRS+ IM: CPT

## 2021-10-28 PROCEDURE — 90732 PPSV23 VACC 2 YRS+ SUBQ/IM: CPT

## 2021-10-28 PROCEDURE — 90471 IMMUNIZATION ADMIN: CPT

## 2021-10-28 PROCEDURE — 90472 IMMUNIZATION ADMIN EACH ADD: CPT

## 2021-10-28 PROCEDURE — 99207 PR NO CHARGE NURSE ONLY: CPT

## 2021-11-09 SDOH — ECONOMIC STABILITY: FOOD INSECURITY: WITHIN THE PAST 12 MONTHS, YOU WORRIED THAT YOUR FOOD WOULD RUN OUT BEFORE YOU GOT MONEY TO BUY MORE.: NEVER TRUE

## 2021-11-09 SDOH — HEALTH STABILITY: PHYSICAL HEALTH: ON AVERAGE, HOW MANY MINUTES DO YOU ENGAGE IN EXERCISE AT THIS LEVEL?: 30 MIN

## 2021-11-09 SDOH — ECONOMIC STABILITY: TRANSPORTATION INSECURITY
IN THE PAST 12 MONTHS, HAS LACK OF TRANSPORTATION KEPT YOU FROM MEETINGS, WORK, OR FROM GETTING THINGS NEEDED FOR DAILY LIVING?: NO

## 2021-11-09 SDOH — ECONOMIC STABILITY: INCOME INSECURITY: HOW HARD IS IT FOR YOU TO PAY FOR THE VERY BASICS LIKE FOOD, HOUSING, MEDICAL CARE, AND HEATING?: NOT HARD AT ALL

## 2021-11-09 SDOH — ECONOMIC STABILITY: TRANSPORTATION INSECURITY
IN THE PAST 12 MONTHS, HAS THE LACK OF TRANSPORTATION KEPT YOU FROM MEDICAL APPOINTMENTS OR FROM GETTING MEDICATIONS?: NO

## 2021-11-09 SDOH — HEALTH STABILITY: PHYSICAL HEALTH: ON AVERAGE, HOW MANY DAYS PER WEEK DO YOU ENGAGE IN MODERATE TO STRENUOUS EXERCISE (LIKE A BRISK WALK)?: 3 DAYS

## 2021-11-09 SDOH — ECONOMIC STABILITY: INCOME INSECURITY: IN THE LAST 12 MONTHS, WAS THERE A TIME WHEN YOU WERE NOT ABLE TO PAY THE MORTGAGE OR RENT ON TIME?: NO

## 2021-11-09 SDOH — ECONOMIC STABILITY: FOOD INSECURITY: WITHIN THE PAST 12 MONTHS, THE FOOD YOU BOUGHT JUST DIDN'T LAST AND YOU DIDN'T HAVE MONEY TO GET MORE.: NEVER TRUE

## 2021-11-09 ASSESSMENT — ENCOUNTER SYMPTOMS
ABDOMINAL PAIN: 0
FEVER: 0
HEMATOCHEZIA: 0
PARESTHESIAS: 0
EYE PAIN: 0
SORE THROAT: 0
NAUSEA: 0
COUGH: 0
DIARRHEA: 0
WEAKNESS: 1
HEARTBURN: 0
DIZZINESS: 0
PALPITATIONS: 1
JOINT SWELLING: 0
MYALGIAS: 0
BREAST MASS: 0
DYSURIA: 0
ARTHRALGIAS: 0
HEMATURIA: 0
CONSTIPATION: 0
FREQUENCY: 0
HEADACHES: 0
SHORTNESS OF BREATH: 1
CHILLS: 0
NERVOUS/ANXIOUS: 0

## 2021-11-09 ASSESSMENT — SOCIAL DETERMINANTS OF HEALTH (SDOH)
ARE YOU MARRIED, WIDOWED, DIVORCED, SEPARATED, NEVER MARRIED, OR LIVING WITH A PARTNER?: LIVING WITH PARTNER
HOW OFTEN DO YOU ATTEND CHURCH OR RELIGIOUS SERVICES?: MORE THAN 4 TIMES PER YEAR
IN A TYPICAL WEEK, HOW MANY TIMES DO YOU TALK ON THE PHONE WITH FAMILY, FRIENDS, OR NEIGHBORS?: THREE TIMES A WEEK
HOW OFTEN DO YOU GET TOGETHER WITH FRIENDS OR RELATIVES?: ONCE A WEEK
DO YOU BELONG TO ANY CLUBS OR ORGANIZATIONS SUCH AS CHURCH GROUPS UNIONS, FRATERNAL OR ATHLETIC GROUPS, OR SCHOOL GROUPS?: YES

## 2021-11-09 ASSESSMENT — LIFESTYLE VARIABLES
HOW MANY STANDARD DRINKS CONTAINING ALCOHOL DO YOU HAVE ON A TYPICAL DAY: 1 OR 2
HOW OFTEN DO YOU HAVE SIX OR MORE DRINKS ON ONE OCCASION: NEVER
HOW OFTEN DO YOU HAVE A DRINK CONTAINING ALCOHOL: 2-3 TIMES A WEEK

## 2021-11-10 ENCOUNTER — OFFICE VISIT (OUTPATIENT)
Dept: INTERNAL MEDICINE | Facility: CLINIC | Age: 61
End: 2021-11-10
Payer: COMMERCIAL

## 2021-11-10 VITALS
TEMPERATURE: 97.5 F | HEIGHT: 62 IN | WEIGHT: 123 LBS | HEART RATE: 89 BPM | DIASTOLIC BLOOD PRESSURE: 72 MMHG | SYSTOLIC BLOOD PRESSURE: 132 MMHG | OXYGEN SATURATION: 100 % | BODY MASS INDEX: 22.63 KG/M2

## 2021-11-10 DIAGNOSIS — J45.30 MILD PERSISTENT ASTHMA WITHOUT COMPLICATION: ICD-10-CM

## 2021-11-10 DIAGNOSIS — A60.04 HERPES SIMPLEX VULVOVAGINITIS: ICD-10-CM

## 2021-11-10 DIAGNOSIS — Z13.29 SCREENING FOR THYROID DISORDER: ICD-10-CM

## 2021-11-10 DIAGNOSIS — E78.5 HYPERLIPIDEMIA LDL GOAL <130: ICD-10-CM

## 2021-11-10 DIAGNOSIS — Z23 NEED FOR SHINGLES VACCINE: ICD-10-CM

## 2021-11-10 DIAGNOSIS — Z00.00 ENCOUNTER FOR ROUTINE ADULT HEALTH EXAMINATION WITHOUT ABNORMAL FINDINGS: Primary | ICD-10-CM

## 2021-11-10 DIAGNOSIS — C34.80 MALIGNANT NEOPLASM OF TRACHEA, BRONCHUS, AND LUNG (H): ICD-10-CM

## 2021-11-10 DIAGNOSIS — M53.3 SACROILIAC PAIN: ICD-10-CM

## 2021-11-10 DIAGNOSIS — A60.00 RECURRENT GENITAL HSV (HERPES SIMPLEX VIRUS) INFECTION: ICD-10-CM

## 2021-11-10 DIAGNOSIS — Z86.39 HISTORY OF HYPERTHYROIDISM: ICD-10-CM

## 2021-11-10 DIAGNOSIS — C33 MALIGNANT NEOPLASM OF TRACHEA, BRONCHUS, AND LUNG (H): ICD-10-CM

## 2021-11-10 DIAGNOSIS — Z13.6 CARDIOVASCULAR SCREENING; LDL GOAL LESS THAN 130: ICD-10-CM

## 2021-11-10 DIAGNOSIS — K21.9 GASTROESOPHAGEAL REFLUX DISEASE WITHOUT ESOPHAGITIS: ICD-10-CM

## 2021-11-10 PROCEDURE — 90750 HZV VACC RECOMBINANT IM: CPT | Performed by: INTERNAL MEDICINE

## 2021-11-10 PROCEDURE — 99396 PREV VISIT EST AGE 40-64: CPT | Mod: 25 | Performed by: INTERNAL MEDICINE

## 2021-11-10 PROCEDURE — 90471 IMMUNIZATION ADMIN: CPT | Performed by: INTERNAL MEDICINE

## 2021-11-10 RX ORDER — ALBUTEROL SULFATE 90 UG/1
2 AEROSOL, METERED RESPIRATORY (INHALATION) EVERY 4 HOURS PRN
Qty: 18 G | Refills: 11 | Status: SHIPPED | OUTPATIENT
Start: 2021-11-10 | End: 2023-03-06

## 2021-11-10 RX ORDER — VALACYCLOVIR HYDROCHLORIDE 500 MG/1
500 TABLET, FILM COATED ORAL DAILY
Qty: 90 TABLET | Refills: 3 | Status: SHIPPED | OUTPATIENT
Start: 2021-11-10 | End: 2022-12-12

## 2021-11-10 ASSESSMENT — ENCOUNTER SYMPTOMS
DIZZINESS: 0
HEADACHES: 0
DIARRHEA: 0
HEMATURIA: 0
PARESTHESIAS: 0
MYALGIAS: 0
COUGH: 0
NAUSEA: 0
FREQUENCY: 0
EYE PAIN: 0
SHORTNESS OF BREATH: 1
WEAKNESS: 1
JOINT SWELLING: 0
ABDOMINAL PAIN: 0
CHILLS: 0
ARTHRALGIAS: 0
FEVER: 0
HEARTBURN: 0
BREAST MASS: 0
DYSURIA: 0
PALPITATIONS: 1
NERVOUS/ANXIOUS: 0
HEMATOCHEZIA: 0
CONSTIPATION: 0
SORE THROAT: 0

## 2021-11-10 ASSESSMENT — MIFFLIN-ST. JEOR: SCORE: 1076.17

## 2021-11-10 NOTE — PROGRESS NOTES
SUBJECTIVE:   CC: Kimberly Larkin is an 61 year old male who presents for preventative health visit.       Patient has been advised of split billing requirements and indicates understanding: Yes  Healthy Habits:     Getting at least 3 servings of Calcium per day:  Yes    Bi-annual eye exam:  Yes    Dental care twice a year:  Yes    Sleep apnea or symptoms of sleep apnea:  Sleep apnea    Diet:  Regular (no restrictions)    Frequency of exercise:  2-3 days/week    Duration of exercise:  30-45 minutes    Taking medications regularly:  Yes    Medication side effects:  None    PHQ-2 Total Score: 2    Additional concerns today:  No      1.  history of lung cancer, s/p resection, followed by thoracic oncology clinic, most recent imaging shows no recurrance.     2.  history of hyperthyroidism, but now resovled without meds, no signs and symptoms of thyrod disease.     Lab Results   Component Value Date    TSH 2.96 10/28/2020    TSH 2.36 04/09/2019    TSH 3.07 01/03/2017    TSH <0.01 11/30/2015    TSH  12/30/2014     <0.01  Effective 7/30/2014, the reference range for this assay has changed to reflect   new instrumentation/methodology.      TSH  08/28/2014     <0.01  Effective 7/30/2014, the reference range for this assay has changed to reflect   new instrumentation/methodology.      TSH 1.67 04/14/2014    TSH 1.79 04/08/2013    TSH 2.18 10/13/2011    TSH 3.45 04/23/2010          3.    Asthma stable.  Has not had any recent breathing troubles beyond usual baseline.  Has not any recent acute respiratory events.  Using medication as directed with reported side effects    ACT Total Scores 4/9/2019 10/28/2020 11/9/2021   ACT TOTAL SCORE (Goal Greater than or Equal to 20) 23 21 22   In the past 12 months, how many times did you visit the emergency room for your asthma without being admitted to the hospital? 0 0 0   In the past 12 months, how many times were you hospitalized overnight because of your asthma? 0 0 0        4.   Hyperlipidemia:  Has history of hyperlipidemia.    The patient is not taking a medication for this.  Denies any significant side effects from his medication.      Latest labs reviewed:    Recent Labs   Lab Test 10/28/20  1228 04/09/19  1519   CHOL 279* 275*   HDL 69 74   * 164*   TRIG 194* 186*        Lab Results   Component Value Date    AST 18 10/28/2020           Today's PHQ-2 Score:   PHQ-2 ( 1999 [employer]) 11/9/2021   Q1: Little interest or pleasure in doing things 1   Q2: Feeling down, depressed or hopeless 1   PHQ-2 Score 2   PHQ-2 Total Score (12-17 Years)- Positive if 3 or more points; Administer PHQ-A if positive -   Q1: Little interest or pleasure in doing things Several days   Q2: Feeling down, depressed or hopeless Several days   PHQ-2 Score 2       Abuse: Current or Past(Physical, Sexual or Emotional)- No  Do you feel safe in your environment? Yes        Social History     Tobacco Use     Smoking status: Never Smoker     Smokeless tobacco: Never Used   Substance Use Topics     Alcohol use: No         Alcohol Use 11/9/2021   Prescreen: >3 drinks/day or >7 drinks/week? No   Prescreen: >3 drinks/day or >7 drinks/week? -       Last PSA: No results found for: PSA    Reviewed orders with patient. Reviewed health maintenance and updated orders accordingly - Yes      Reviewed and updated as needed this visit by clinical staff  Tobacco  Allergies  Meds             Reviewed and updated as needed this visit by Provider                 **I reviewed the information recorded in the patient's EPIC chart (including but not limited to medical history, surgical history, family history, problem list, medication list, and allergy list) and updated the information as indicated based on the patients reported information.         Review of Systems   Constitutional: Negative for chills and fever.   HENT: Positive for congestion. Negative for ear pain, hearing loss and sore throat.    Eyes: Negative for pain and visual  "disturbance.   Respiratory: Positive for shortness of breath. Negative for cough.    Cardiovascular: Positive for palpitations. Negative for chest pain and peripheral edema.   Gastrointestinal: Negative for abdominal pain, constipation, diarrhea, heartburn, hematochezia and nausea.   Breasts:  Negative for tenderness, breast mass and discharge.   Genitourinary: Positive for vaginal discharge. Negative for dysuria, frequency, genital sores, hematuria, pelvic pain, urgency and vaginal bleeding.   Musculoskeletal: Negative for arthralgias, joint swelling and myalgias.   Skin: Negative for rash.   Neurological: Positive for weakness. Negative for dizziness, headaches and paresthesias.   Psychiatric/Behavioral: Positive for mood changes. The patient is not nervous/anxious.      CONSTITUTIONAL: NEGATIVE for fever, chills, change in weight  INTEGUMENTARY/SKIN: NEGATIVE for worrisome rashes, moles or lesions  EYES: NEGATIVE for vision changes or irritation  ENT: NEGATIVE for ear, mouth and throat problems  RESP: NEGATIVE for significant cough or SOB  CV: NEGATIVE for chest pain, palpitations or peripheral edema  GI: NEGATIVE for nausea, abdominal pain, heartburn, or change in bowel habits   male: negative for dysuria, hematuria, decreased urinary stream, erectile dysfunction, urethral discharge  MUSCULOSKELETAL: NEGATIVE for significant arthralgias or myalgia  NEURO: NEGATIVE for weakness, dizziness or paresthesias  PSYCHIATRIC: NEGATIVE for changes in mood or affect    OBJECTIVE:   /72   Pulse 89   Temp 97.5  F (36.4  C) (Temporal)   Ht 1.575 m (5' 2\")   Wt 55.8 kg (123 lb)   LMP 10/12/2006   SpO2 100%   BMI 22.50 kg/m      Physical Exam  GENERAL alert and no distress  EYES:  Normal sclera,conjunctiva, EOMI  HENT: oral and posterior pharynx without lesions or erythema, facies symmetric  NECK: Neck supple. No LAD, without thyroidmegaly.  RESP: Clear to ausculation bilaterally without wheezes or crackles. " Normal BS in all fields.  CV: RRR normal S1S2 without murmurs, rubs or gallops.  LYMPH: no cervical lymph adenopathy appreciated  MS: extremities- no gross deformities of the visible extremities noted,   EXT:  no lower extremity edema  PSYCH: Alert and oriented times 3; speech- coherent  SKIN:  No obvious significant skin lesions on visible portions of face     Diagnostic Test Results:  Labs reviewed in Epic        ASSESSMENT/PLAN:     (Z00.00) Encounter for routine adult health examination without abnormal findings  (primary encounter diagnosis)  Comment: Discussed cardiac disease risk factor modification including screening, preventing, and treating hypertension, elevated lipids, diabetes, and smoking cessation.    Discussed age appropriate cancer screening recommendations as dictated by age group and past medical history.    Recommended making better food choices as often as possible, including lower carb, lower fat, lower salt diet and moderation in any alcohol intake.    Recommended maintaining regular physical activity/exercise throughout their lifetime.  Recommended safety and injury prevention (I.e. seatbelt use, safety equipment like helmets when biking, etc).    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  This condition is currently controlled on the current medical regimen.  Continue current therapy.   Continue regular f/u as dictated by oncology clinic   Plan:     (J45.30) Mild persistent asthma without complication  Comment: This condition is currently controlled on the current medical regimen.  Continue current therapy.   Plan: albuterol (PROAIR HFA/PROVENTIL HFA/VENTOLIN         HFA) 108 (90 Base) MCG/ACT inhaler            (M53.3) Sacroiliac pain  Comment: This condition is currently  controlled on the current medical regimen.  Continue current therapy.   Plan:     (A60.04) Herpes simplex vulvovaginitis  Comment: take to prevent outbreaks.   Plan: valACYclovir (VALTREX) 500 MG tablet            (Z23) Need for shingles vaccine  Comment:   Plan: ZOSTER VACCINE RECOMBINANT ADJUVANTED IM NJX            (A60.00) Recurrent genital HSV (herpes simplex virus) infection  Comment:   Plan: valACYclovir (VALTREX) 500 MG tablet            (Z13.29) Screening for thyroid disorder  Comment:   Plan: TSH with free T4 reflex            (Z13.6) CARDIOVASCULAR SCREENING; LDL GOAL LESS THAN 130  Comment: Discussed cardiac disease risk factors and cardiac disease risk factor modification.     The 10-year ASCVD risk score (Sulaiman THURMAN Jr., et al., 2013) is: 4.3%    Values used to calculate the score:      Age: 61 years      Sex: Female      Is Non- : No      Diabetic: No      Tobacco smoker: No      Systolic Blood Pressure: 132 mmHg      Is BP treated: No      HDL Cholesterol: 69 mg/dL      Total Cholesterol: 279 mg/dL     Plan: Lipid panel reflex to direct LDL Fasting, Basic        metabolic panel, AST, ALT, CBC with platelets            (Z86.39) History of hyperthyroidism  Comment: recheck thyroid labs, no meds needed unless labs abnormal.   Plan: TSH with free T4 reflex            (E78.5) Hyperlipidemia LDL goal <130  Comment: mild lipid elevation no meds needed   Plan: Lipid panel reflex to direct LDL Fasting, AST,         ALT            (K21.9) Gastroesophageal reflux disease without esophagitis  Comment: This condition is currently controlled on the current medical regimen.  Continue current therapy.   Plan: Basic metabolic panel, CBC with platelets               Patient has been advised of split billing requirements and indicates understanding: Yes  COUNSELING:   Reviewed preventive health counseling, as reflected in patient instructions       Regular exercise       Healthy diet/nutrition       " Vision screening       Hearing screening       Immunizations    up to date           Estimated body mass index is 22.5 kg/m  as calculated from the following:    Height as of this encounter: 1.575 m (5' 2\").    Weight as of this encounter: 55.8 kg (123 lb).         She reports that she has never smoked. She has never used smokeless tobacco.      Counseling Resources:  ATP IV Guidelines  Pooled Cohorts Equation Calculator  FRAX Risk Assessment  ICSI Preventive Guidelines  Dietary Guidelines for Americans, 2010  USDA's MyPlate  ASA Prophylaxis  Lung CA Screening    Gilberto Clifford MD  [facility]  "

## 2021-11-10 NOTE — PATIENT INSTRUCTIONS
"*  Second Shingrix shingles vaccine given today    *  Return for a \"lab only appointment\" in the next 1-2 weeks to recheck fasting labs.  Please get these labs done in a fasting state with nothing to eat for at least 8 hours prior to getting labs drawn.  You can make this \"lab only appointment\" at any [facility] lab site, contact that clinic site directly to arrange this.  I will make contact either via phone or [employer] regarding the results and any recommendations once I have reviewed the lab results.    *  Continue all medications at the same doses.  Contact your usual pharmacy if you need refills.     *  Return to see me in 1 year, sooner if needed.  Use [employer] or Call [phone] to schedule the appointment with me.         The 10-year ASCVD risk score (Sulaiman THURMAN Jr., et al., 2013) is: 4.3%    Values used to calculate the score:      Age: 61 years      Sex: Female      Is Non- : No      Diabetic: No      Tobacco smoker: No      Systolic Blood Pressure: 132 mmHg      Is BP treated: No      HDL Cholesterol: 69 mg/dL      Total Cholesterol: 279 mg/dL         5 GOALS TO PREVENT VASCULAR DISEASE:     1.  Aggressive blood pressure control, under 130/80 ideally.  Using medications if needed.    Your blood pressure is under good control    BP Readings from Last 4 Encounters:   11/10/21 132/72   05/06/21 118/80   01/20/21 (!) 150/80   10/28/20 138/88       2.  Aggressive LDL cholesterol (\"bad cholesterol\") lowering as indicated.    Your goal is an LDL under 130 for sure, preferably under 100.  (If you have diabetes or previous vascular disease, the the LDL goals would be under 100 for sure, preferably under 70.)    New guidelines identify four high-risk groups who could benefit from statins:   *people with pre-existing heart disease, such as those who have had a heart attack;   *people ages 40 to 75 who have diabetes of any type  *patients ages 40 to 75 with at least a 7.5% risk of " developing cardiovascular disease over the next decade, according to a formula described in the guidelines  *patients with the sort of super-high cholesterol that sometimes runs in families, as evidenced by an LDL of 190 milligrams per deciliter or higher    Your cholesterol levels are well controlled.    Recent Labs   Lab Test 10/28/20  1228 04/09/19  1519   CHOL 279* 275*   HDL 69 74   * 164*   TRIG 194* 186*       3.  Aggressive diabetic prevention, screening and/or management.      You do not have diabetes as of the most recent blood tests.     4.  No smoking    5.  Consider daily preventative aspirin over age 50 if you have enough cardiac risk factors to place you at higher risk for the presence of vascular disease.    If you have any reason not to take aspirin such easy bruising or bleeding, stomach problems, other anticoagulant medications, or any other side effects, then you should not take Aspirin.     --Based on your current cardiac disease risk profile and/or age over 75, you do NOT need to take daily preventative aspirin.

## 2021-11-22 ASSESSMENT — ASTHMA QUESTIONNAIRES: ACT_TOTALSCORE: 22

## 2021-11-23 ENCOUNTER — HOSPITAL ENCOUNTER (OUTPATIENT)
Dept: MAMMOGRAPHY | Facility: CLINIC | Age: 61
Discharge: HOME | End: 2021-11-23
Attending: INTERNAL MEDICINE | Admitting: INTERNAL MEDICINE
Payer: COMMERCIAL

## 2021-11-23 DIAGNOSIS — Z12.31 VISIT FOR SCREENING MAMMOGRAM: ICD-10-CM

## 2021-11-23 PROCEDURE — 77067 SCR MAMMO BI INCL CAD: CPT

## 2021-11-30 ENCOUNTER — TELEPHONE (OUTPATIENT)
Dept: INTERNAL MEDICINE | Facility: CLINIC | Age: 61
End: 2021-11-30

## 2021-11-30 ENCOUNTER — LAB (OUTPATIENT)
Dept: LAB | Facility: CLINIC | Age: 61
End: 2021-11-30
Payer: COMMERCIAL

## 2021-11-30 DIAGNOSIS — Z13.6 CARDIOVASCULAR SCREENING; LDL GOAL LESS THAN 130: ICD-10-CM

## 2021-11-30 DIAGNOSIS — E78.5 HYPERLIPIDEMIA LDL GOAL <130: ICD-10-CM

## 2021-11-30 DIAGNOSIS — Z86.39 HISTORY OF HYPERTHYROIDISM: ICD-10-CM

## 2021-11-30 DIAGNOSIS — Z13.29 SCREENING FOR THYROID DISORDER: ICD-10-CM

## 2021-11-30 DIAGNOSIS — K21.9 GASTROESOPHAGEAL REFLUX DISEASE WITHOUT ESOPHAGITIS: ICD-10-CM

## 2021-11-30 DIAGNOSIS — Z12.11 SCREEN FOR COLON CANCER: Primary | ICD-10-CM

## 2021-11-30 LAB
ERYTHROCYTE [DISTWIDTH] IN BLOOD BY AUTOMATED COUNT: 13.5 % (ref 10–15)
HCT VFR BLD AUTO: 39.6 % (ref 35–47)
HGB BLD-MCNC: 12.7 G/DL (ref 11.7–15.7)
MCH RBC QN AUTO: 24.7 PG (ref 26.5–33)
MCHC RBC AUTO-ENTMCNC: 32.1 G/DL (ref 31.5–36.5)
MCV RBC AUTO: 77 FL (ref 78–100)
PLATELET # BLD AUTO: 360 10E3/UL (ref 150–450)
RBC # BLD AUTO: 5.14 10E6/UL (ref 3.8–5.2)
WBC # BLD AUTO: 6.4 10E3/UL (ref 4–11)

## 2021-11-30 PROCEDURE — 84439 ASSAY OF FREE THYROXINE: CPT

## 2021-11-30 PROCEDURE — 85027 COMPLETE CBC AUTOMATED: CPT

## 2021-11-30 PROCEDURE — 84450 TRANSFERASE (AST) (SGOT): CPT

## 2021-11-30 PROCEDURE — 80061 LIPID PANEL: CPT

## 2021-11-30 PROCEDURE — 80048 BASIC METABOLIC PNL TOTAL CA: CPT

## 2021-11-30 PROCEDURE — 84460 ALANINE AMINO (ALT) (SGPT): CPT

## 2021-11-30 PROCEDURE — 84443 ASSAY THYROID STIM HORMONE: CPT

## 2021-11-30 PROCEDURE — 36415 COLL VENOUS BLD VENIPUNCTURE: CPT

## 2021-11-30 NOTE — TELEPHONE ENCOUNTER
Pt calling and is requesting a referral for a colonoscopy. She would like to have it done at [facility]. Please place order if appropriate. Thank you.  Cinthia Daniel,

## 2021-12-01 LAB
ALT SERPL W P-5'-P-CCNC: 32 U/L (ref 0–50)
ANION GAP SERPL CALCULATED.3IONS-SCNC: 7 MMOL/L (ref 3–14)
AST SERPL W P-5'-P-CCNC: 22 U/L (ref 0–45)
BUN SERPL-MCNC: 10 MG/DL (ref 7–30)
CALCIUM SERPL-MCNC: 8.9 MG/DL (ref 8.5–10.1)
CHLORIDE BLD-SCNC: 106 MMOL/L (ref 94–109)
CHOLEST SERPL-MCNC: 237 MG/DL
CO2 SERPL-SCNC: 28 MMOL/L (ref 20–32)
CREAT SERPL-MCNC: 0.84 MG/DL (ref 0.52–1.04)
FASTING STATUS PATIENT QL REPORTED: YES
GFR SERPL CREATININE-BSD FRML MDRD: 75 ML/MIN/1.73M2
GLUCOSE BLD-MCNC: 97 MG/DL (ref 70–99)
HDLC SERPL-MCNC: 65 MG/DL
LDLC SERPL CALC-MCNC: 141 MG/DL
NONHDLC SERPL-MCNC: 172 MG/DL
POTASSIUM BLD-SCNC: 4.2 MMOL/L (ref 3.4–5.3)
SODIUM SERPL-SCNC: 141 MMOL/L (ref 133–144)
T4 FREE SERPL-MCNC: 1.01 NG/DL (ref 0.76–1.46)
TRIGL SERPL-MCNC: 154 MG/DL
TSH SERPL DL<=0.005 MIU/L-ACNC: 4.36 MU/L (ref 0.4–4)

## 2021-12-02 ENCOUNTER — TELEPHONE (OUTPATIENT)
Dept: GASTROENTEROLOGY | Facility: CLINIC | Age: 61
End: 2021-12-02
Payer: COMMERCIAL

## 2021-12-02 DIAGNOSIS — Z11.59 ENCOUNTER FOR SCREENING FOR OTHER VIRAL DISEASES: ICD-10-CM

## 2021-12-02 NOTE — TELEPHONE ENCOUNTER
Screening Questions  1. Are you active on mychart? Y    2. What insurance is in the chart? [employer]    2.  Ordering/Referring Provider: Darrin    3. BMI 22.9, If greater than 40 review exclusion criteria    4.  Respiratory Screening (If yes to any of the following Hospital setting only):     Do you use daily home oxygen? N  Do you have mod to severe Obstructive Sleep Apnea? Yes-mild, no cpap needed   Do you have Pulmonary Hypertension? N   Do you have UNCONTROLLED asthma? Mild asthma-inhaler used once in awhile    5. Have you had a heart or lung transplant (If yes, please review exclusion criteria) ? No transplant-but left lower lobe removed for CA    6. Are you currently on dialysis or have chronic kidney disease? N    7. Have you had a stroke or Transient ischemic attack (TIA) within 6 months? N    8. In the past 6 months, have you had any heart related issues including cardiomyopathy or heart attack? N                 If yes, did it require cardiac stenting or other implantable device?N      9. Do you have any implantable devices in your body (pacemaker, defib, LVAD)? N    10. Do you take nitroglycerin? If yes, how often? N    11. Are you currently taking any blood thinners?N    12. Are you a diabetic? N    13. (Females) Are you currently pregnant? NA  If yes, how many weeks?      15. Are you taking any prescription pain medications on a routine schedule? Yes-celebrex as needed, but not on a routine schedule   If yes, MAC sedation.    16. Do you have any chemical dependencies such as alcohol, street drugs, or methadone? N If yes, MAC sedation.    17. Do you have any history of post-traumatic stress syndrome, severe anxiety or history of psychosis? N    18. Do you transfer independently? Yes    19.  Do you have any issues with constipation? N    20. Preferred Pharmacy for Pre Prescription [id number] [place], [place] - [address] AT [address] [facility]    Scheduling Details    Which  Colonoscopy Prep was Sent?: Miralax  Procedure Scheduled: Colon  Surgeon: CARMEL Busby  Date of Procedure: 12/20/21  Location:   Caller (Please ask for phone number if not scheduled by patient): Kimberly      Sedation Type: CS  Conscious Sedation- Needs  for 6 hours after the procedure  MAC/General-Needs  for 24 hours after procedure    Pre-op Required at [facility] [place], [place] and OR for MAC sedation:   (if yes advise patient they will need a pre-op prior to procedure)      Is patient on blood thinners? -N (If yes- inform patient to follow up with PCP or provider for follow up instructions)     Informed patient they will need an adult  Yes  Cannot take any type of public or medical transportation alone    Pre-Procedure Covid test to be completed at [facility] or Externally: [place] 12/16/21    Confirmed Nurse will call to complete assessment Yes    Additional comments:

## 2021-12-16 ENCOUNTER — LAB (OUTPATIENT)
Dept: URGENT CARE | Facility: URGENT CARE | Age: 61
End: 2021-12-16
Payer: COMMERCIAL

## 2021-12-16 DIAGNOSIS — Z11.59 ENCOUNTER FOR SCREENING FOR OTHER VIRAL DISEASES: ICD-10-CM

## 2021-12-16 LAB — SARS-COV-2 RNA RESP QL NAA+PROBE: NEGATIVE

## 2021-12-16 PROCEDURE — U0003 INFECTIOUS AGENT DETECTION BY NUCLEIC ACID (DNA OR RNA); SEVERE ACUTE RESPIRATORY SYNDROME CORONAVIRUS 2 (SARS-COV-2) (CORONAVIRUS DISEASE [COVID-19]), AMPLIFIED PROBE TECHNIQUE, MAKING USE OF HIGH THROUGHPUT TECHNOLOGIES AS DESCRIBED BY CMS-2020-01-R: HCPCS

## 2021-12-16 PROCEDURE — U0005 INFEC AGEN DETEC AMPLI PROBE: HCPCS

## 2021-12-20 ENCOUNTER — HOSPITAL ENCOUNTER (OUTPATIENT)
Facility: CLINIC | Age: 61
Discharge: HOME | End: 2021-12-20
Attending: INTERNAL MEDICINE | Admitting: INTERNAL MEDICINE
Payer: COMMERCIAL

## 2021-12-20 VITALS
BODY MASS INDEX: 23 KG/M2 | SYSTOLIC BLOOD PRESSURE: 126 MMHG | WEIGHT: 125 LBS | OXYGEN SATURATION: 100 % | HEIGHT: 62 IN | DIASTOLIC BLOOD PRESSURE: 87 MMHG | RESPIRATION RATE: 16 BRPM | HEART RATE: 67 BPM

## 2021-12-20 LAB — COLONOSCOPY: NORMAL

## 2021-12-20 PROCEDURE — 45378 DIAGNOSTIC COLONOSCOPY: CPT | Performed by: INTERNAL MEDICINE

## 2021-12-20 PROCEDURE — G0121 COLON CA SCRN NOT HI RSK IND: HCPCS | Performed by: INTERNAL MEDICINE

## 2021-12-20 PROCEDURE — 250N000011 HC RX IP 250 OP 636: Performed by: INTERNAL MEDICINE

## 2021-12-20 PROCEDURE — G0500 MOD SEDAT ENDO SERVICE >5YRS: HCPCS | Performed by: INTERNAL MEDICINE

## 2021-12-20 RX ORDER — FENTANYL CITRATE 50 UG/ML
INJECTION, SOLUTION INTRAMUSCULAR; INTRAVENOUS PRN
Status: COMPLETED | OUTPATIENT
Start: 2021-12-20 | End: 2021-12-20

## 2021-12-20 RX ADMIN — MIDAZOLAM 2 MG: 1 INJECTION INTRAMUSCULAR; INTRAVENOUS at 14:57

## 2021-12-20 RX ADMIN — FENTANYL CITRATE 100 MCG: 50 INJECTION, SOLUTION INTRAMUSCULAR; INTRAVENOUS at 14:55

## 2021-12-20 ASSESSMENT — MIFFLIN-ST. JEOR: SCORE: 1085.25

## 2021-12-20 NOTE — H&P
Kimberly Larkin  [id number]  female  61 year old      Reason for procedure/surgery: Screening colonoscopy    Patient Active Problem List   Diagnosis     Leiomyoma of uterus     Anemia     Malignant neoplasm of trachea, bronchus, and lung (H)     Low back pain     CARDIOVASCULAR SCREENING; LDL GOAL LESS THAN 160     Advanced directives, counseling/discussion     Lumbar disc herniation with radiculopathy     Backache     Routine general medical examination at a health care facility     Encounter for routine gynecological examination     Gastroesophageal reflux disease without esophagitis     Hyperthyroidism     HSV-2 seropositive     Mild persistent asthma without complication     Hyperlipidemia LDL goal <130       Past Surgical History:    Past Surgical History:   Procedure Laterality Date     C THORACOSCOPY SURG LOBECTOMY  3/30/07    Squamous cell carcinoma, left lower lobe lung, surgical stage T2N0M0     C/SECTION, LOW TRANSVERSE      , Low Transverse x 2     CHOLECYSTECTOMY, LAPOROSCOPIC  11    Cholecystectomy, Laparoscopic with intraoperative cholangiogram     COLONOSCOPY  11    normal colonoscopy     HYSTERECTOMY, PAP NO LONGER INDICATED      LEYDI/BSO for myoma     HYSTERECTOMY, LEYDI      LEYDI/BSO for myoma       Past Medical History:   Past Medical History:   Diagnosis Date     Helicobacter pylori gastritis 2014    per EGD biopsy; treated with 14 days triple therapy by GI     Hyperlipidemia LDL goal <130 2019         Hyperthyroidism 2015     Malignant neoplasm of bronchus and lung, unspecified site 2007    Squamous cell carcinoma, left lower lobe lung, surgical stage T2N0M0     Uncomplicated asthma     mild       Social History:   Social History     Tobacco Use     Smoking status: Never Smoker     Smokeless tobacco: Never Used   Substance Use Topics     Alcohol use: No       Family History:   Family History   Problem Relation Age of Onset     Hypertension  "Father      Cerebrovascular Disease Father      C.A.D. Father      Eye Disorder Father         cataract     Diabetes Mother      Family History Negative Brother      Family History Negative Brother      Family History Negative Brother      Family History Negative Sister      Cancer Brother          throat cancer age 36       Allergies:   Allergies   Allergen Reactions     Seasonal Allergies      Sneezing, sinus congestion, runny nose       Active Medications:   No current outpatient medications on file.       Systemic Review:   CONSTITUTIONAL: NEGATIVE for fever, chills, change in weight  ENT/MOUTH: NEGATIVE for ear, mouth and throat problems  RESP: NEGATIVE for significant cough or SOB  CV: NEGATIVE for chest pain, palpitations or peripheral edema    Physical Examination:   Vital Signs: /83   Resp 16   Ht 1.575 m (5' 2\")   Wt 56.7 kg (125 lb)   LMP 10/12/2006   SpO2 100%   BMI 22.86 kg/m    GENERAL: healthy, alert and no distress  NECK: no adenopathy, no asymmetry, masses, or scars  RESP: lungs clear to auscultation - no rales, rhonchi or wheezes  CV: regular rate and rhythm, normal S1 S2, no S3 or S4, no murmur, click or rub, no peripheral edema and peripheral pulses strong  ABDOMEN: soft, nontender, no hepatosplenomegaly, no masses and bowel sounds normal  MS: no gross musculoskeletal defects noted, no edema    ASA: 2    Mallampati Score: 2    Plan: Appropriate to proceed as scheduled.      Juan Busby MD  2021    PCP:  Gilberto Clifford    "

## 2022-09-08 DIAGNOSIS — M53.3 SACROILIAC PAIN: ICD-10-CM

## 2022-09-09 RX ORDER — CELECOXIB 200 MG/1
CAPSULE ORAL
Qty: 60 CAPSULE | Refills: 1 | Status: SHIPPED | OUTPATIENT
Start: 2022-09-09 | End: 2022-11-15

## 2022-09-09 NOTE — TELEPHONE ENCOUNTER
Prescription approved per [facility] Refill Protocol.  Nikhil Rajput RN  [facility] Triage Nurse

## 2022-10-31 ENCOUNTER — NURSE TRIAGE (OUTPATIENT)
Dept: INTERNAL MEDICINE | Facility: CLINIC | Age: 62
End: 2022-10-31

## 2022-10-31 NOTE — TELEPHONE ENCOUNTER
"1. LOCATION: \"Which ear is involved?\"        Left ear.   2. SENSATION: \"Describe how the ear feels.\" (e.g. stuffy, full, plugged).\"       Ringing in the left ear with congestion. Dizziness for a second when getting up, but then goes away. Gradual decrease in hearing in the left ear in the last 2-3 days. Hearing is sometimes better when patient moves the ear, but then back to decreased hearing (more than 50%).   3. ONSET:  \"When did the ear symptoms start?\"        Two weeks ago.   4. PAIN: \"Do you also have an earache?\" If Yes, ask: \"How bad is it?\" (Scale 1-10; or mild, moderate, severe)      No pain, tender.   5. CAUSE: \"What do you think is causing the ear congestion?\"      Does not know, might be related to previous mild cold.   6. URI: \"Do you have a runny nose or cough?\"       Mild cold with runny nose and sore throat started 2 weeks ago.   7. NASAL ALLERGIES: \"Are there symptoms of hay fever, such as sneezing or a clear nasal discharge?\"      Not this time of the year. Has not tried OTC antihistamine or decongestant for this issue.     Pt would like an appt for this tomorrow morning. Requesting to use a same day tomorrow morning with APOORVA Cedeno or other available provider.     Reason for Disposition    Ear congestion present > 48 hours    Hearing loss (complete or partial) is main symptom    Decreased hearing in 1 ear and gradual onset    Tinnitus (ringing, hissing, beating) and only or mainly in 1 ear    Additional Information    Negative: Ear pain is main symptom    Negative: Earwax is main concern    Negative: Has nasal allergies and they are acting up    Negative: Earache lasts > 1 hour    Negative: Pus or cloudy discharge from ear canal    Negative: Patient wants to be seen    Negative: Ringing in the ears (tinnitus) and taking aspirin and dosage sounds high (i.e., > 1500 mg/day)    Negative: Hearing loss in one or both ears of sudden onset and present now    Negative: Earache    Negative: " Decreased hearing followed sudden, extremely loud noise (e.g., explosion, not just loud concert)    Negative: Decreased hearing and taking medication that can damage hearing (i.e., gentamycin, tobramycin, furosemide, ethacrynic acid, cisplatin, quinidine)    Negative: Patient sounds very sick or weak to the triager    Negative: Patient wants to be seen    Negative: Recurrent episodes of hearing loss, dizziness, and ringing in the ear    Protocols used: EAR - CONGESTION-A-OH, HEARING LOSS OR CHANGE-A-OH

## 2022-10-31 NOTE — TELEPHONE ENCOUNTER
See Elvira tomorrow or else go to Urgent Care.   May be simple wax buildup, or could be eustachian tube dysfunction, based on the symptoms described, much less likel ear infection.

## 2022-11-01 ENCOUNTER — OFFICE VISIT (OUTPATIENT)
Dept: INTERNAL MEDICINE | Facility: CLINIC | Age: 62
End: 2022-11-01
Payer: COMMERCIAL

## 2022-11-01 VITALS
OXYGEN SATURATION: 97 % | SYSTOLIC BLOOD PRESSURE: 136 MMHG | RESPIRATION RATE: 16 BRPM | DIASTOLIC BLOOD PRESSURE: 82 MMHG | HEART RATE: 84 BPM | WEIGHT: 124.5 LBS | BODY MASS INDEX: 22.77 KG/M2

## 2022-11-01 DIAGNOSIS — R09.89 CHEST CONGESTION: ICD-10-CM

## 2022-11-01 DIAGNOSIS — H61.22 IMPACTED CERUMEN OF LEFT EAR: Primary | ICD-10-CM

## 2022-11-01 PROCEDURE — 96127 BRIEF EMOTIONAL/BEHAV ASSMT: CPT | Performed by: PHYSICIAN ASSISTANT

## 2022-11-01 PROCEDURE — 99213 OFFICE O/P EST LOW 20 MIN: CPT | Mod: 25 | Performed by: PHYSICIAN ASSISTANT

## 2022-11-01 PROCEDURE — 69209 REMOVE IMPACTED EAR WAX UNI: CPT | Mod: LT | Performed by: PHYSICIAN ASSISTANT

## 2022-11-01 ASSESSMENT — ASTHMA QUESTIONNAIRES
ACT_TOTALSCORE: 20
QUESTION_2 LAST FOUR WEEKS HOW OFTEN HAVE YOU HAD SHORTNESS OF BREATH: ONCE OR TWICE A WEEK
QUESTION_3 LAST FOUR WEEKS HOW OFTEN DID YOUR ASTHMA SYMPTOMS (WHEEZING, COUGHING, SHORTNESS OF BREATH, CHEST TIGHTNESS OR PAIN) WAKE YOU UP AT NIGHT OR EARLIER THAN USUAL IN THE MORNING: ONCE OR TWICE
QUESTION_4 LAST FOUR WEEKS HOW OFTEN HAVE YOU USED YOUR RESCUE INHALER OR NEBULIZER MEDICATION (SUCH AS ALBUTEROL): ONCE A WEEK OR LESS
QUESTION_5 LAST FOUR WEEKS HOW WOULD YOU RATE YOUR ASTHMA CONTROL: WELL CONTROLLED
ACT_TOTALSCORE: 20
QUESTION_1 LAST FOUR WEEKS HOW MUCH OF THE TIME DID YOUR ASTHMA KEEP YOU FROM GETTING AS MUCH DONE AT WORK, SCHOOL OR AT HOME: A LITTLE OF THE TIME

## 2022-11-01 ASSESSMENT — PATIENT HEALTH QUESTIONNAIRE - PHQ9
SUM OF ALL RESPONSES TO PHQ QUESTIONS 1-9: 9
SUM OF ALL RESPONSES TO PHQ QUESTIONS 1-9: 9
10. IF YOU CHECKED OFF ANY PROBLEMS, HOW DIFFICULT HAVE THESE PROBLEMS MADE IT FOR YOU TO DO YOUR WORK, TAKE CARE OF THINGS AT HOME, OR GET ALONG WITH OTHER PEOPLE: SOMEWHAT DIFFICULT

## 2022-11-01 NOTE — PROGRESS NOTES
Assessment & Plan     Impacted cerumen of left ear  Unable to get out in clinic - impaction too far back in the ear canal   - IA REMOVAL IMPACTED CERUMEN IRRIGATION/LVG UNILAT  - Adult ENT  Referral; Future    Chest congestion                 Patient Instructions   Mucinex for chest congestion     Over the Counter Debrox solution to help with ear wax -   You can schedule with this clinic in one week if you would like to try an ear lavage again after that.       Return in about 2 months (around 1/1/2023) for Routine Visit, regular primary provider.    Elvira Johnson PA-C  [facility]    Myron Harris is a 62 year old, presenting for the following health issues:  Ear Problem      History of Present Illness       Reason for visit:  Ear clogged Left ear   Symptom onset:  1-2 weeks ago  Symptoms include:  Alot of Phlegm in the moring and stuffy nose  Symptom intensity:  Moderate  Symptom progression:  Worsening  Had these symptoms before:  No  What makes it worse:  I don't know  What makes it better:  No    She eats 2-3 servings of fruits and vegetables daily.She consumes 1 sweetened beverage(s) daily.She exercises with enough effort to increase her heart rate 20 to 29 minutes per day.  She exercises with enough effort to increase her heart rate 3 or less days per week.   She is taking medications regularly.    Today's PHQ-9         PHQ-9 Total Score: 9    PHQ-9 Q9 Thoughts of better off dead/self-harm past 2 weeks :   Not at all    How difficult have these problems made it for you to do your work, take care of things at home, or get along with other people: Somewhat difficult     Has not been taking anything over the counter.             Review of Systems   Constitutional, HEENT, cardiovascular, pulmonary, gi and gu systems are negative, except as otherwise noted.      Objective    /82   Pulse 84   Resp 16   Wt 56.5 kg (124 lb 8 oz)   LMP 10/12/2006    SpO2 97%   BMI 22.77 kg/m    Body mass index is 22.77 kg/m .  Physical Exam   GENERAL: healthy, alert and no distress  HENT: normal cephalic/atraumatic, right ear: normal: no effusions, no erythema, normal landmarks and left ear: occluded with wax  RESP: lungs clear to auscultation - no rales, rhonchi or wheezes  CV: regular rates and rhythm and normal S1 S2, no S3 or S4  SKIN: no suspicious lesions or rashes

## 2022-11-01 NOTE — PATIENT INSTRUCTIONS
Mucinex for chest congestion     Over the Counter Debrox solution to help with ear wax -   You can schedule with this clinic in one week if you would like to try an ear lavage again after that.

## 2022-11-01 NOTE — TELEPHONE ENCOUNTER
Upon chart review, patient already scheduled for an appointment with Elvira Johnson this morning.     Appointments in Next Year    Nov 01, 2022 10:00 AM  (Arrive by 9:40 AM)  Provider Visit with Elvira Johnson PA-C  [facility] ([facility] ) [phone]     Closing encounter.     Yi Hanson RN

## 2022-11-15 DIAGNOSIS — M53.3 SACROILIAC PAIN: ICD-10-CM

## 2022-11-15 RX ORDER — CELECOXIB 200 MG/1
CAPSULE ORAL
Qty: 60 CAPSULE | Refills: 0 | Status: SHIPPED | OUTPATIENT
Start: 2022-11-15 | End: 2023-01-12

## 2022-11-15 NOTE — TELEPHONE ENCOUNTER
Prescription approved per [facility] Refill Protocol.  Justice L. Phoenix, RN

## 2022-12-07 ENCOUNTER — HOSPITAL ENCOUNTER (OUTPATIENT)
Dept: MAMMOGRAPHY | Facility: CLINIC | Age: 62
Discharge: HOME | End: 2022-12-07
Admitting: INTERNAL MEDICINE
Payer: COMMERCIAL

## 2022-12-07 DIAGNOSIS — Z12.31 VISIT FOR SCREENING MAMMOGRAM: ICD-10-CM

## 2022-12-07 PROCEDURE — 77067 SCR MAMMO BI INCL CAD: CPT

## 2022-12-25 ENCOUNTER — HEALTH MAINTENANCE LETTER (OUTPATIENT)
Age: 62
End: 2022-12-25

## 2023-01-11 DIAGNOSIS — M53.3 SACROILIAC PAIN: ICD-10-CM

## 2023-01-12 RX ORDER — CELECOXIB 200 MG/1
CAPSULE ORAL
Qty: 60 CAPSULE | Refills: 0 | Status: SHIPPED | OUTPATIENT
Start: 2023-01-12 | End: 2023-02-13

## 2023-02-11 DIAGNOSIS — M53.3 SACROILIAC PAIN: ICD-10-CM

## 2023-02-13 RX ORDER — CELECOXIB 200 MG/1
CAPSULE ORAL
Qty: 60 CAPSULE | Refills: 0 | Status: SHIPPED | OUTPATIENT
Start: 2023-02-13 | End: 2023-03-06

## 2023-03-03 ASSESSMENT — ENCOUNTER SYMPTOMS
JOINT SWELLING: 0
MYALGIAS: 0
DIZZINESS: 0
PALPITATIONS: 0
HEADACHES: 0
ABDOMINAL PAIN: 0
BREAST MASS: 0
HEARTBURN: 0
NAUSEA: 0
DIARRHEA: 0
PARESTHESIAS: 0
CHILLS: 0
NERVOUS/ANXIOUS: 0
FEVER: 0
SHORTNESS OF BREATH: 1
COUGH: 0
HEMATURIA: 0
FREQUENCY: 0
ARTHRALGIAS: 0
CONSTIPATION: 0
EYE PAIN: 0
SORE THROAT: 0
WEAKNESS: 1
HEMATOCHEZIA: 0
DYSURIA: 0

## 2023-03-06 ENCOUNTER — OFFICE VISIT (OUTPATIENT)
Dept: INTERNAL MEDICINE | Facility: CLINIC | Age: 63
End: 2023-03-06
Payer: COMMERCIAL

## 2023-03-06 VITALS
SYSTOLIC BLOOD PRESSURE: 120 MMHG | DIASTOLIC BLOOD PRESSURE: 66 MMHG | HEIGHT: 62 IN | TEMPERATURE: 98.3 F | HEART RATE: 92 BPM | WEIGHT: 121 LBS | BODY MASS INDEX: 22.26 KG/M2 | OXYGEN SATURATION: 97 %

## 2023-03-06 DIAGNOSIS — A60.00 RECURRENT GENITAL HSV (HERPES SIMPLEX VIRUS) INFECTION: ICD-10-CM

## 2023-03-06 DIAGNOSIS — A60.04 HERPES SIMPLEX VULVOVAGINITIS: ICD-10-CM

## 2023-03-06 DIAGNOSIS — J45.30 MILD PERSISTENT ASTHMA WITHOUT COMPLICATION: ICD-10-CM

## 2023-03-06 DIAGNOSIS — C33 MALIGNANT NEOPLASM OF TRACHEA, BRONCHUS, AND LUNG (H): ICD-10-CM

## 2023-03-06 DIAGNOSIS — Z00.00 ROUTINE GENERAL MEDICAL EXAMINATION AT A HEALTH CARE FACILITY: Primary | ICD-10-CM

## 2023-03-06 DIAGNOSIS — C34.80 MALIGNANT NEOPLASM OF TRACHEA, BRONCHUS, AND LUNG (H): ICD-10-CM

## 2023-03-06 DIAGNOSIS — E78.5 HYPERLIPIDEMIA LDL GOAL <130: ICD-10-CM

## 2023-03-06 DIAGNOSIS — Z13.29 SCREENING FOR THYROID DISORDER: ICD-10-CM

## 2023-03-06 DIAGNOSIS — M53.3 SACROILIAC PAIN: ICD-10-CM

## 2023-03-06 DIAGNOSIS — Z13.6 CARDIOVASCULAR SCREENING; LDL GOAL LESS THAN 160: ICD-10-CM

## 2023-03-06 PROBLEM — M54.59 MECHANICAL LOW BACK PAIN: Status: ACTIVE | Noted: 2019-11-29

## 2023-03-06 LAB
ALBUMIN SERPL BCG-MCNC: 4.5 G/DL (ref 3.5–5.2)
ALP SERPL-CCNC: 61 U/L (ref 35–104)
ALT SERPL W P-5'-P-CCNC: 29 U/L (ref 10–35)
ANION GAP SERPL CALCULATED.3IONS-SCNC: 13 MMOL/L (ref 7–15)
AST SERPL W P-5'-P-CCNC: 31 U/L (ref 10–35)
BILIRUB SERPL-MCNC: 0.4 MG/DL
BUN SERPL-MCNC: 10.9 MG/DL (ref 8–23)
CALCIUM SERPL-MCNC: 9.8 MG/DL (ref 8.8–10.2)
CHLORIDE SERPL-SCNC: 101 MMOL/L (ref 98–107)
CHOLEST SERPL-MCNC: 297 MG/DL
CREAT SERPL-MCNC: 0.85 MG/DL (ref 0.51–0.95)
DEPRECATED HCO3 PLAS-SCNC: 25 MMOL/L (ref 22–29)
ERYTHROCYTE [DISTWIDTH] IN BLOOD BY AUTOMATED COUNT: 14 % (ref 10–15)
GFR SERPL CREATININE-BSD FRML MDRD: 77 ML/MIN/1.73M2
GLUCOSE SERPL-MCNC: 106 MG/DL (ref 70–99)
HCT VFR BLD AUTO: 39.6 % (ref 35–47)
HDLC SERPL-MCNC: 70 MG/DL
HGB BLD-MCNC: 13.3 G/DL (ref 11.7–15.7)
LDLC SERPL CALC-MCNC: 190 MG/DL
MCH RBC QN AUTO: 25 PG (ref 26.5–33)
MCHC RBC AUTO-ENTMCNC: 33.6 G/DL (ref 31.5–36.5)
MCV RBC AUTO: 74 FL (ref 78–100)
NONHDLC SERPL-MCNC: 227 MG/DL
PLATELET # BLD AUTO: 373 10E3/UL (ref 150–450)
POTASSIUM SERPL-SCNC: 4.2 MMOL/L (ref 3.4–5.3)
PROT SERPL-MCNC: 7.6 G/DL (ref 6.4–8.3)
RBC # BLD AUTO: 5.32 10E6/UL (ref 3.8–5.2)
SODIUM SERPL-SCNC: 139 MMOL/L (ref 136–145)
TRIGL SERPL-MCNC: 187 MG/DL
TSH SERPL DL<=0.005 MIU/L-ACNC: 3.07 UIU/ML (ref 0.3–4.2)
WBC # BLD AUTO: 6.6 10E3/UL (ref 4–11)

## 2023-03-06 PROCEDURE — 84443 ASSAY THYROID STIM HORMONE: CPT | Performed by: INTERNAL MEDICINE

## 2023-03-06 PROCEDURE — 85027 COMPLETE CBC AUTOMATED: CPT | Performed by: INTERNAL MEDICINE

## 2023-03-06 PROCEDURE — 80061 LIPID PANEL: CPT | Performed by: INTERNAL MEDICINE

## 2023-03-06 PROCEDURE — 99396 PREV VISIT EST AGE 40-64: CPT | Performed by: INTERNAL MEDICINE

## 2023-03-06 PROCEDURE — 80053 COMPREHEN METABOLIC PANEL: CPT | Performed by: INTERNAL MEDICINE

## 2023-03-06 PROCEDURE — 36415 COLL VENOUS BLD VENIPUNCTURE: CPT | Performed by: INTERNAL MEDICINE

## 2023-03-06 RX ORDER — ALBUTEROL SULFATE 90 UG/1
2 AEROSOL, METERED RESPIRATORY (INHALATION) EVERY 4 HOURS PRN
Qty: 18 G | Refills: 11 | Status: SHIPPED | OUTPATIENT
Start: 2023-03-06 | End: 2024-03-11

## 2023-03-06 RX ORDER — VALACYCLOVIR HYDROCHLORIDE 500 MG/1
500 TABLET, FILM COATED ORAL DAILY
Qty: 90 TABLET | Refills: 3 | Status: SHIPPED | OUTPATIENT
Start: 2023-03-06 | End: 2024-03-11

## 2023-03-06 RX ORDER — CELECOXIB 200 MG/1
200 CAPSULE ORAL 2 TIMES DAILY PRN
Qty: 60 CAPSULE | Refills: 11 | Status: SHIPPED | OUTPATIENT
Start: 2023-03-06

## 2023-03-06 ASSESSMENT — ENCOUNTER SYMPTOMS
SHORTNESS OF BREATH: 1
FEVER: 0
DYSURIA: 0
HEMATOCHEZIA: 0
PALPITATIONS: 0
FREQUENCY: 0
HEARTBURN: 0
HEADACHES: 0
NAUSEA: 0
NERVOUS/ANXIOUS: 0
CHILLS: 0
BREAST MASS: 0
MYALGIAS: 0
WEAKNESS: 1
EYE PAIN: 0
COUGH: 0
ARTHRALGIAS: 0
HEMATURIA: 0
ABDOMINAL PAIN: 0
CONSTIPATION: 0
DIZZINESS: 0
JOINT SWELLING: 0
DIARRHEA: 0
PARESTHESIAS: 0
SORE THROAT: 0

## 2023-03-06 ASSESSMENT — ASTHMA QUESTIONNAIRES: ACT_TOTALSCORE: 23

## 2023-03-06 NOTE — PATIENT INSTRUCTIONS
"  5 GOALS TO PREVENT VASCULAR DISEASE:     Your future cardiac risk is fairly low:      Anything below 7.5% is good and average.     A risk of over 7.5% would make us put you on a cholesterol lowering medication.       The 10-year ASCVD risk score (Lorenzo SAENZ, et al., 2019) is: 4.1%    Values used to calculate the score:      Age: 63 years      Sex: Female      Is Non- : No      Diabetic: No      Tobacco smoker: No      Systolic Blood Pressure: 120 mmHg      Is BP treated: No      HDL Cholesterol: 65 mg/dL      Total Cholesterol: 237 mg/dL       1.  Aggressive blood pressure control, under 130/80 ideally.  Using medications if needed.    Your blood pressure is under good control    BP Readings from Last 4 Encounters:   03/06/23 120/66   11/01/22 136/82   12/20/21 126/87   11/10/21 132/72       2.  Aggressive LDL cholesterol (\"bad cholesterol\") lowering as indicated.    Your goal is an LDL under 130 for sure, preferably under 100.  (If you have diabetes or previous vascular disease, the the LDL goals would be under 100 for sure, preferably under 70.)    New guidelines identify four high-risk groups who could benefit from statins:   *people with pre-existing heart disease, such as those who have had a heart attack;   *people ages 40 to 75 who have diabetes of any type  *patients ages 40 to 75 with at least a 7.5% risk of developing cardiovascular disease over the next decade, according to a formula described in the guidelines  *patients with the sort of super-high cholesterol that sometimes runs in families, as evidenced by an LDL of 190 milligrams per deciliter or higher    Your cholesterol levels are well controlled.    Recent Labs   Lab Test 11/30/21  0828 10/28/20  1228   CHOL 237* 279*   HDL 65 69   * 171*   TRIG 154* 194*       3.  Aggressive diabetic prevention, screening and/or management.      You do not have diabetes as of the most recent blood tests.     4.  No smoking    5.  " Consider daily preventative aspirin over age 50 if you have enough cardiac risk factors to place you at higher risk for the presence of vascular disease.    If you have any reason not to take aspirin such easy bruising or bleeding, stomach problems, other anticoagulant medications, or any other side effects, then you should not take Aspirin.     --Based on your current cardiac disease risk profile and/or age over 75, you do NOT need to take daily preventative aspirin.            Preventive Health Recommendations  Female Ages 50 - 64    Yearly exam: See your health care provider every year in order to  Review health changes.   Discuss preventive care.    Review your medicines if your doctor has prescribed any.    Get a Pap test every three years (unless you have an abnormal result and your provider advises testing more often).  If you get Pap tests with HPV test, you only need to test every 5 years, unless you have an abnormal result.   You do not need a Pap test if your uterus was removed (hysterectomy) and you have not had cancer.  You should be tested each year for STDs (sexually transmitted diseases) if you're at risk.   Have a mammogram every 1 to 2 years.  Have a colonoscopy at age 50, or have a yearly FIT test (stool test). These exams screen for colon cancer.    Have a cholesterol test every 5 years, or more often if advised.  Have a diabetes test (fasting glucose) every three years. If you are at risk for diabetes, you should have this test more often.   If you are at risk for osteoporosis (brittle bone disease), think about having a bone density scan (DEXA).    Shots: Get a flu shot each year. Get a tetanus shot every 10 years.    Nutrition:   Eat at least 5 servings of fruits and vegetables each day.  Eat whole-grain bread, whole-wheat pasta and brown rice instead of white grains and rice.  Get adequate Calcium and Vitamin D.        --Good Grains:  Oats, brown rice, Quinoa (these do not raise the blood  "sugar as much)     --Bad grains:  Anything made from wheat or white rice     (because these raise the blood sugars significantly, and the possible gluten issue from wheat for some people).      --Proteins:  Aim for \"lean proteins\" including chicken, fish, seafood, pork, turkey, and eggs (in moderation); Eat red meat only occasionally    Lifestyle  Exercise at least 150 minutes a week (30 minutes a day, 5 days a week). This will help you control your weight and prevent disease.  Limit alcohol to one drink per day.  No smoking.   Wear sunscreen to prevent skin cancer.   See your dentist every six months for an exam and cleaning.  See your eye doctor every 1 to 2 years.    "

## 2023-03-06 NOTE — PROGRESS NOTES
SUBJECTIVE:   CC: Kimberly is an 63 year old who presents for preventive health visit.   Patient has been advised of split billing requirements and indicates understanding: Yes  Healthy Habits:     Getting at least 3 servings of Calcium per day:  NO    Bi-annual eye exam:  Yes    Dental care twice a year:  Yes    Sleep apnea or symptoms of sleep apnea:  Excessive snoring    Diet:  Regular (no restrictions)    Frequency of exercise:  2-3 days/week    Duration of exercise:  15-30 minutes    Taking medications regularly:  Yes    Barriers to taking medications:  None    Medication side effects:  None    PHQ-2 Total Score: 2    1.  History of lung cancer, status post lobectomy resection 2007.  She had regular CT scans and follow-up through the thoracic oncology clinic for several years afterwards then was deemed cured and return to me for routine monitoring.  She denies any changes or worsening in breathing status.  No hemoptysis, no unintended weight loss, no unexplained fevers or chills or night sweats.    2.  History of intermittent asthma, uses albuterol infrequently.  No significant exacerbations.    3.  History of recurrent genital herpes infections, taking Valtrex at suppressive doses.    Today's PHQ-2 Score:   PHQ-2 ( 1999 Pfizer) 3/3/2023   Q1: Little interest or pleasure in doing things 2   Q2: Feeling down, depressed or hopeless 0   PHQ-2 Score 2   PHQ-2 Total Score (12-17 Years)- Positive if 3 or more points; Administer PHQ-A if positive -   Q1: Little interest or pleasure in doing things More than half the days   Q2: Feeling down, depressed or hopeless Not at all   PHQ-2 Score 2     Social History     Tobacco Use     Smoking status: Never     Smokeless tobacco: Never   Substance Use Topics     Alcohol use: No     Alcohol Use 3/3/2023   Prescreen: >3 drinks/day or >7 drinks/week? No       Reviewed orders with patient.  Reviewed health maintenance and updated orders accordingly - Yes      Breast Cancer  Screening:    Breast CA Risk Assessment (FHS-7) 11/9/2021 11/23/2021   Do you have a family history of breast, colon, or ovarian cancer? No / Unknown No / Unknown         Mammogram Screening: Recommended mammography every 1-2 years with patient discussion and risk factor consideration  Pertinent mammograms are reviewed under the imaging tab.    History of abnormal Pap smear: Status post benign hysterectomy. Health Maintenance and Surgical History updated.  PAP / HPV Latest Ref Rng & Units 11/24/2015 7/23/2012 4/11/2011   PAP (Historical) - NIL NIL NIL   HPV16 NEG Negative - -   HPV18 NEG Negative - -   HRHPV NEG Negative - -     Reviewed and updated as needed this visit by clinical staff   Tobacco  Allergies  Meds   Med Hx  Surg Hx  Fam Hx          Reviewed and updated as needed this visit by Provider       Med Hx  Surg Hx  Fam Hx           **I reviewed the information recorded in the patient's EPIC chart (including but not limited to medical history, surgical history, family history, problem list, medication list, and allergy list) and updated the information as indicated based on the patients reported information.         Review of Systems   Constitutional: Negative for chills and fever.   HENT: Positive for congestion. Negative for ear pain, hearing loss and sore throat.    Eyes: Negative for pain and visual disturbance.   Respiratory: Positive for shortness of breath. Negative for cough.    Cardiovascular: Negative for chest pain, palpitations and peripheral edema.   Gastrointestinal: Negative for abdominal pain, constipation, diarrhea, heartburn, hematochezia and nausea.   Breasts:  Positive for discharge. Negative for tenderness and breast mass.   Genitourinary: Negative for dysuria, frequency, genital sores, hematuria, pelvic pain, urgency, vaginal bleeding and vaginal discharge.   Musculoskeletal: Negative for arthralgias, joint swelling and myalgias.   Skin: Negative for rash.   Neurological:  "Positive for weakness. Negative for dizziness, headaches and paresthesias.   Psychiatric/Behavioral: Negative for mood changes. The patient is not nervous/anxious.      CONSTITUTIONAL: NEGATIVE for fever, chills, change in weight  INTEGUMENTARY/SKIN: NEGATIVE for worrisome rashes, moles or lesions  EYES: NEGATIVE for vision changes or irritation  ENT: NEGATIVE for ear, mouth and throat problems  RESP: NEGATIVE for significant cough or SOB  BREAST: NEGATIVE for masses, tenderness or discharge  CV: NEGATIVE for chest pain, palpitations or peripheral edema  GI: NEGATIVE for nausea, abdominal pain, heartburn, or change in bowel habits  : NEGATIVE for unusual urinary or vaginal symptoms. No vaginal bleeding.  MUSCULOSKELETAL: NEGATIVE for significant arthralgias or myalgia  NEURO: NEGATIVE for weakness, dizziness or paresthesias  PSYCHIATRIC: NEGATIVE for changes in mood or affect      OBJECTIVE:   /66   Pulse 92   Temp 98.3  F (36.8  C) (Oral)   Ht 1.575 m (5' 2\")   Wt 54.9 kg (121 lb)   LMP 10/11/2006 (Exact Date)   SpO2 97%   Breastfeeding No   BMI 22.13 kg/m    Physical Exam  GENERAL alert and no distress  EYES:  Normal sclera,conjunctiva, EOMI  HENT: oral and posterior pharynx without lesions or erythema, facies symmetric  NECK: Neck supple. No LAD, without thyroidmegaly.  RESP: Clear to ausculation bilaterally without wheezes or crackles. Normal BS in all fields.  CV: RRR normal S1S2 without murmurs, rubs or gallops.  LYMPH: no cervical lymph adenopathy appreciated  MS: extremities- no gross deformities of the visible extremities noted,   EXT:  no lower extremity edema  PSYCH: Alert and oriented times 3; speech- coherent  SKIN:  No obvious significant skin lesions on visible portions of face     Diagnostic Test Results:  Labs reviewed in Epic    ASSESSMENT/PLAN:     (Z00.00) Routine general medical examination at a health care facility  (primary encounter diagnosis)  Comment: Discussed cardiac " disease risk factor modification including screening, preventing, and treating hypertension, elevated lipids, diabetes, and smoking cessation.    Discussed age appropriate cancer screening recommendations as dictated by age group and past medical history.    Recommended making better food choices as often as possible, including lower carb, lower fat, lower salt diet and moderation in any alcohol intake.    Recommended maintaining regular physical activity/exercise throughout their lifetime.  Recommended safety and injury prevention (I.e. seatbelt use, safety equipment like helmets when biking, etc).    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  Subsequent CT scans for several year showed no evidence of recurrence.  At this point 16 years later, she would be considered cured from the lung cancer.  No further surveillance chest CT scans are indicated.  Plan: REVIEW OF HEALTH MAINTENANCE PROTOCOL ORDERS            (J45.30) Mild persistent asthma without complication  Comment: This condition is currently controlled on the current medical regimen.  Continue current therapy.   Use albuterol as needed.  Plan: REVIEW OF HEALTH MAINTENANCE PROTOCOL ORDERS,         albuterol (PROAIR HFA/PROVENTIL HFA/VENTOLIN         HFA) 108 (90 Base) MCG/ACT inhaler            (A60.04) Herpes simplex vulvovaginitis  Comment: This condition is currently controlled on the current medical regimen.  Continue current therapy.   Plan: valACYclovir (VALTREX) 500 MG tablet            (A60.00) Recurrent genital HSV (herpes simplex virus) infection  Comment: This condition is currently controlled on the current medical regimen.  Continue current therapy.   Plan: valACYclovir (VALTREX) 500 MG tablet            (M53.3) Sacroiliac  pain  Comment: This condition is currently controlled on the current medical regimen.  Continue current therapy.   Plan: REVIEW OF HEALTH MAINTENANCE PROTOCOL ORDERS,         celecoxib (CELEBREX) 200 MG capsule            (Z13.6) CARDIOVASCULAR SCREENING; LDL GOAL LESS THAN 160  Comment: Discussed cardiac disease risk factors and cardiac disease risk factor modification.   10-year cardiac disease risk is 4.1% based on her current parameters.  No indication for statin, no indication for preventative daily aspirin.      The 10-year ASCVD risk score (Lorenzo SAENZ, et al., 2019) is: 4.1%    Values used to calculate the score:      Age: 63 years      Sex: Female      Is Non- : No      Diabetic: No      Tobacco smoker: No      Systolic Blood Pressure: 120 mmHg      Is BP treated: No      HDL Cholesterol: 65 mg/dL      Total Cholesterol: 237 mg/dL       Plan: REVIEW OF HEALTH MAINTENANCE PROTOCOL ORDERS,         CBC with platelets, Comprehensive metabolic         panel, Lipid panel reflex to direct LDL Fasting            (E78.5) Hyperlipidemia LDL goal <130  Comment: Lipids are mildly elevated, but no indication for medication.  HDL is excellent.  Plan: REVIEW OF HEALTH MAINTENANCE PROTOCOL ORDERS,         Comprehensive metabolic panel, Lipid panel         reflex to direct LDL Fasting            (Z13.29) Screening for thyroid disorder  Comment:   Plan: REVIEW OF HEALTH MAINTENANCE PROTOCOL ORDERS,         TSH with free T4 reflex               Patient has been advised of split billing requirements and indicates understanding: Yes      COUNSELING:  Reviewed preventive health counseling, as reflected in patient instructions       Regular exercise       Healthy diet/nutrition       Vision screening       Hearing screening       Immunizations    up to date including most recent COVID booster.    Get the updated COVID booster next fall at the same time she gets the annual flu shot.           Colorectal  Cancer Screening        She reports that she has never smoked. She has never used smokeless tobacco.          Gilberto Clifford MD  [facility]

## 2023-03-15 ENCOUNTER — VIRTUAL VISIT (OUTPATIENT)
Dept: INTERNAL MEDICINE | Facility: CLINIC | Age: 63
End: 2023-03-15
Payer: COMMERCIAL

## 2023-03-15 DIAGNOSIS — E78.5 HYPERLIPIDEMIA LDL GOAL <100: Primary | ICD-10-CM

## 2023-03-15 PROCEDURE — 99213 OFFICE O/P EST LOW 20 MIN: CPT | Mod: 95 | Performed by: INTERNAL MEDICINE

## 2023-03-15 RX ORDER — ROSUVASTATIN CALCIUM 10 MG/1
10 TABLET, COATED ORAL DAILY
Qty: 90 TABLET | Refills: 0 | Status: SHIPPED | OUTPATIENT
Start: 2023-03-15 | End: 2023-06-19

## 2023-03-15 NOTE — PROGRESS NOTES
"Kimberly is a 63 year old who is being evaluated via a billable telephone visit.      What phone number would you like to be contacted at? [phone]  How would you like to obtain your AVS? Juan  Distant Location (provider location):  On-site      Assessment & Plan     (E78.5) Hyperlipidemia LDL goal <100  (primary encounter diagnosis)  Comment: Discussed current lipid results, previous results (if available) current guidelines (NCEP) for treatment and goals for lipids.  Discussed lifestyle modification, dietary changes (low fat, low simple carb) and regular aerobic exercise.  Discussed the link between dysmetabolic syndrome and impaired glucose tolerance seen in certain patterns of lipids.  Briefly discussed medication used for lipid lowering, including the statins are their possible side effects of myalgias, rhabdomyolysis, and liver toxicity.     She favors more aggressive management of her LDL and would liek to try statin.   Start rosuvastatin 10 mg ocne per day.   Reviewed side effects.   consinder CoQ 10 supplement to reduce chance of side effects.     Return for a \"lab only appointment\" in approximately 3 months.  Please get these labs done in a fasting state with nothing to eat for at least 8 hours prior to getting labs drawn.  I will make contact regarding the results and any recommendations once I have reviewed them.       Plan: rosuvastatin (CRESTOR) 10 MG tablet, Lipid         panel reflex to direct LDL Fasting, AST, ALT                            Return in about 3 months (around 6/15/2023) for fasting \"lab only\" visit.    Gilberto Clifford MD  [facility]      The 10-year ASCVD risk score (Lorenzo SAENZ, et al., 2019) is: 4.5%    Values used to calculate the score:      Age: 63 years      Sex: Female      Is Non- : No      Diabetic: No      Tobacco smoker: No      Systolic Blood Pressure: 120 mmHg      Is BP treated: No      HDL Cholesterol: 70 " mg/dL      Total Cholesterol: 297 mg/dL      Myron Harris is a 63 year old accompanied by her spouse, presenting for the following health issues:  Results (Follow up labs)      HPI     Follow up lab results    Hyperlipidemia:  Has history of hyperlipidemia.    The patient is not taking a medication for this.  Denies any significant side effects from his medication.      Latest labs reviewed:    Recent Labs   Lab Test 03/06/23  1043 11/30/21  0828   CHOL 297* 237*   HDL 70 65   * 141*   TRIG 187* 154*        Lab Results   Component Value Date    AST 31 03/06/2023    AST 18 10/28/2020         **I reviewed the information recorded in the patient's EPIC chart (including but not limited to medical history, surgical history, family history, problem list, medication list, and allergy list) and updated the information as indicated based on the patients reported information.             Review of Systems   Constitutional, HEENT, cardiovascular, pulmonary, gi and gu systems are negative, except as otherwise noted.      Objective           Vitals:  No vitals were obtained today due to virtual visit.    Physical Exam   healthy, alert and no distress  PSYCH: Alert and oriented times 3; coherent speech, normal   rate and volume, able to articulate logical thoughts, able   to abstract reason, no tangential thoughts, no hallucinations   or delusions  Her affect is normal  RESP: No cough, no audible wheezing, able to talk in full sentences  Remainder of exam unable to be completed due to telephone visits                Phone call duration: 17:45 minutes

## 2023-03-16 NOTE — PATIENT INSTRUCTIONS
" OK to start rosuvastatin for more aggressive cardiac risk factor modification.      Start rosuvastatin 10 mg once per day, usually best tolerated if taken at bedtime, but anytime works.      Main side effects of this class of medication include, but are not limited to are new or worsened muscle aching or intestinal upset.  Stop the medication and contact us if any concerns for side effects occur.   These statin medications work in the liver where they interfere with the production of LDL cholesterol, therefore any medication in the liver may result in low grade inflammation in the liver, either from the medication itself, or more commonly allowing other things that irritate the liver to have a larger impact (such as alcohol or excessive acetaminophen (tylenol) use).  To reduce the chance of any such liver inflammation, be sure to moderate any alcohol use and keep alcohol intake no more than one drink per day on average.   Occasional higher amounts of alcohol are not likely to cause problems, just try not to drink larger amounts of alcohol on regular or semi-regular basis. We monitor for liver issues as part of the routine monitoring for this medication and will let you know if any issues arise.       Return for a \"lab only appointment\" in approximately 2-3 months (before the end of the first bottle)  Please get these labs done in a fasting state with nothing to eat for at least 8 hours prior to getting labs drawn.  I will make contact regarding the results and any recommendations once I have reviewed them.      The nutritional supplement CoQ10 200 mg per day can help muscle pains and also help reduce the chance of musculoskeletal and soft tissue side effects from \"statin\" cholesterol lowering medications.  You can take any over the counter version and find at any grocery or drug store.  The cheapest prices are usually found at [employer] or [employer].       5 GOALS TO PREVENT VASCULAR DISEASE:      Statin cholesterol " "lowering medications are indicated for more aggressive cardiac disease risk factor modification when the 10 year event risk os above 7.5%    Your 10-year ASCVD risk score (Lorenzo SAENZ, et al., 2019) is: 4.5%    Values used to calculate the score:      Age: 63 years      Sex: Female      Is Non- : No      Diabetic: No      Tobacco smoker: No      Systolic Blood Pressure: 120 mmHg      Is BP treated: No      HDL Cholesterol: 70 mg/dL      Total Cholesterol: 297 mg/dL     1.  Aggressive blood pressure control, under 130/80 ideally.  Using medications if needed.    Your blood pressure is under good control    BP Readings from Last 4 Encounters:   03/06/23 120/66   11/01/22 136/82   12/20/21 126/87   11/10/21 132/72       2.  Aggressive LDL cholesterol (\"bad cholesterol\") lowering as indicated.    Your goal is an LDL under 130 for sure, preferably under 100.  (If you have diabetes or previous vascular disease, the the LDL goals would be under 100 for sure, preferably under 70.)    New guidelines identify four high-risk groups who could benefit from statins:   *people with pre-existing heart disease, such as those who have had a heart attack;   *people ages 40 to 75 who have diabetes of any type  *patients ages 40 to 75 with at least a 7.5% risk of developing cardiovascular disease over the next decade, according to a formula described in the guidelines  *patients with the sort of super-high cholesterol that sometimes runs in families, as evidenced by an LDL of 190 milligrams per deciliter or higher    Your cholesterol levels are elevated.    Recent Labs   Lab Test 03/06/23  1043 11/30/21  0828   CHOL 297* 237*   HDL 70 65   * 141*   TRIG 187* 154*       3.  Aggressive diabetic prevention, screening and/or management.      You do not have diabetes as of the most recent blood tests.     4.  No smoking    5.  Consider daily preventative aspirin over age 50 if you have enough cardiac risk factors " to place you at higher risk for the presence of vascular disease.    If you have any reason not to take aspirin such easy bruising or bleeding, stomach problems, other anticoagulant medications, or any other side effects, then you should not take Aspirin.     --Based on your current cardiac disease risk profile and/or age over 75, you do NOT need to take daily preventative aspirin.

## 2023-06-15 DIAGNOSIS — E78.5 HYPERLIPIDEMIA LDL GOAL <100: ICD-10-CM

## 2023-06-19 RX ORDER — ROSUVASTATIN CALCIUM 10 MG/1
TABLET, COATED ORAL
Qty: 90 TABLET | Refills: 2 | Status: SHIPPED | OUTPATIENT
Start: 2023-06-19 | End: 2024-03-11

## 2023-11-07 ENCOUNTER — PATIENT OUTREACH (OUTPATIENT)
Dept: CARE COORDINATION | Facility: CLINIC | Age: 63
End: 2023-11-07
Payer: COMMERCIAL

## 2023-12-12 ENCOUNTER — HOSPITAL ENCOUNTER (OUTPATIENT)
Dept: MAMMOGRAPHY | Facility: CLINIC | Age: 63
Discharge: HOME | End: 2023-12-12
Attending: INTERNAL MEDICINE | Admitting: INTERNAL MEDICINE
Payer: COMMERCIAL

## 2023-12-12 DIAGNOSIS — Z12.31 VISIT FOR SCREENING MAMMOGRAM: ICD-10-CM

## 2023-12-12 PROCEDURE — 77067 SCR MAMMO BI INCL CAD: CPT

## 2023-12-14 ENCOUNTER — TELEPHONE (OUTPATIENT)
Dept: INTERNAL MEDICINE | Facility: CLINIC | Age: 63
End: 2023-12-14
Payer: COMMERCIAL

## 2023-12-14 NOTE — TELEPHONE ENCOUNTER
Pt wants to get RSV vaccine. When pt went to 's pharmacy, she was advised to call PCP since insurance would not cover at pharmacy. Triage advised pt to call  her insurance and find out where is covered. Call clinic back and schedule a nurse only visit if vaccine is covered at clinic.

## 2023-12-21 ENCOUNTER — E-VISIT (OUTPATIENT)
Dept: URGENT CARE | Facility: CLINIC | Age: 63
End: 2023-12-21
Payer: COMMERCIAL

## 2023-12-21 ENCOUNTER — NURSE TRIAGE (OUTPATIENT)
Dept: INTERNAL MEDICINE | Facility: CLINIC | Age: 63
End: 2023-12-21

## 2023-12-21 DIAGNOSIS — B30.9 VIRAL CONJUNCTIVITIS: Primary | ICD-10-CM

## 2023-12-21 PROCEDURE — 99207 PR NON-BILLABLE SERV PER CHARTING: CPT | Performed by: PREVENTIVE MEDICINE

## 2023-12-21 RX ORDER — KETOTIFEN FUMARATE 0.35 MG/ML
SOLUTION/ DROPS OPHTHALMIC
Qty: 5 ML | Refills: 0 | Status: SHIPPED | OUTPATIENT
Start: 2023-12-21

## 2023-12-21 NOTE — TELEPHONE ENCOUNTER
"Patient has eye redness slight eye lid swelling yellow discharge grand daughter has pink eye and they were exposed huddled with Dr. Clifford patient okay to submit and Evisit     .      Reason for Disposition   Eye with yellow or green discharge or eyelashes stick together, but NO standing order to call in antibiotic eye drops  (Exception: [facility]; continue triage.)    Additional Information   Negative: Eye exposure to chemical or fumes   Negative: Redness of white of eye (sclera), but no pus or only a small amount of brief pus   Negative: MILD eye pain or discomfort and wears contacts   Negative: Discharge from penis   Negative: New or abnormal vaginal discharge   Negative: Using antibiotic eyedrops > 3 days but pus persists   Negative: Lots of yellow or green nasal discharge   Negative: Weak immune system (e.g., HIV positive, cancer chemo, splenectomy, organ transplant, chronic steroids)   Negative: Patient wants to be seen   Negative: Fever present > 3 days (72 hours)   Negative: Bleeding on white of the eye   Negative: MODERATE eye pain (e.g., interferes with normal activities)   Negative: Looking at light causes MODERATE to SEVERE eye pain (i.e., photophobia)   Negative: Blurred vision   Negative: Cloudy spot or sore seen on the cornea (clear part of the eye)   Negative: Eyelids are very swollen (shut or almost)   Negative: Eyelid (outer) is very red and painful (or tender to touch)   Negative: Fever > 103 F (39.4 C)   Negative: SEVERE pain (e.g., excruciating)   Negative: Patient sounds very sick or weak to the triager    Answer Assessment - Initial Assessment Questions  1. EYE DISCHARGE: \"Is the discharge in one or both eyes?\" \"What color is it?\" \"How much is there?\" \"When did the discharge start?\"       Yellow discharge  2. REDNESS OF SCLERA: \"Is the redness in one or both eyes?\" \"When did the redness start?\"       red  3. EYELIDS: \"Are the eyelids red or swollen?\" If Yes, ask: \"How much?\"       Red and slightly " "swollen   4. VISION: \"Is there any difficulty seeing clearly?\"       Seeing clearly   5. PAIN: \"Is there any pain? If Yes, ask: \"How bad is it?\" (Scale 1-10; or mild, moderate, severe)     - MILD (1-3): doesn't interfere with normal activities      - MODERATE (4-7): interferes with normal activities or awakens from sleep     - SEVERE (8-10): excruciating pain, unable to do any normal activities        mild  6. CONTACT LENS: \"Do you wear contacts?\"      no  7. OTHER SYMPTOMS: \"Do you have any other symptoms?\" (e.g., fever, runny nose, cough)      cough  8. PREGNANCY: \"Is there any chance you are pregnant?\" \"When was your last menstrual period?\"      no    Protocols used: Eye - Pus or [id number]    "

## 2023-12-21 NOTE — PATIENT INSTRUCTIONS
Thank you for choosing us for your care. I have placed an order for a prescription so that you can start treatment. View your full visit summary for details by clicking on the link below. Your pharmacist will able to address any questions you may have about the medication.     If you re not feeling better within 2-3 days, please schedule an appointment.  You can schedule an appointment right here in [facility], or call [phone]  If the visit is for the same symptoms as your eVisit, we ll refund the cost of your eVisit if seen within seven days.    Pinkeye: Care Instructions  Overview     Pinkeye is redness and swelling of the eye surface and the conjunctiva (the lining of the eyelid and the covering of the white part of the eye). Pinkeye is also called conjunctivitis. Pinkeye is often caused by infection with bacteria or a virus. Dry air, allergies, smoke, and chemicals are other common causes.  Pinkeye often gets better on its own in 7 to 10 days. Antibiotics only help if the pinkeye is caused by bacteria. Pinkeye caused by infection spreads easily. If an allergy or chemical is causing pinkeye, it will not go away unless you can avoid whatever is causing it.  Follow-up care is a key part of your treatment and safety. Be sure to make and go to all appointments, and call your doctor if you are having problems. It's also a good idea to know your test results and keep a list of the medicines you take.  How can you care for yourself at home?  Wash your hands often. Always wash them before and after you treat pinkeye or touch your eyes or face.  Use moist cotton or a clean, wet cloth to remove crust. Wipe from the inside corner of the eye to the outside. Use a clean part of the cloth for each wipe.  Put cold or warm wet cloths on your eye a few times a day if the eye hurts.  Do not wear contact lenses or eye makeup until the pinkeye is gone. Throw away any eye makeup you were using when you got pinkeye. Clean your  "contacts and storage case. If you wear disposable contacts, use a new pair when your eye has cleared and it is safe to wear contacts again.  If the doctor gave you antibiotic ointment or eyedrops, use them as directed. Use the medicine for as long as instructed, even if your eye starts looking better soon. Keep the bottle tip clean, and do not let it touch the eye area.  To put in eyedrops or ointment:  Tilt your head back, and pull your lower eyelid down with one finger.  Drop or squirt the medicine inside the lower lid.  Close your eye for 30 to 60 seconds to let the drops or ointment move around.  Do not touch the ointment or dropper tip to your eyelashes or any other surface.  Do not share towels, pillows, or washcloths while you have pinkeye.  When should you call for help?   Call your doctor now or seek immediate medical care if:    You have pain in your eye, not just irritation on the surface.     You have a change in vision or loss of vision.     You have an increase in discharge from the eye.     Your eye has not started to improve or begins to get worse within 48 hours after you start using antibiotics.     Pinkeye lasts longer than 7 days.   Watch closely for changes in your health, and be sure to contact your doctor if you have any problems.  Where can you learn more?  Go to https://www.[email or web address].net/patiented  Enter Y392 in the search box to learn more about \"Pinkeye: Care Instructions.\"  Current as of: June 6, 2023               Content Version: 13.8    8066-5531 [employer].   Care instructions adapted under license by your healthcare professional. If you have questions about a medical condition or this instruction, always ask your healthcare professional. [employer] disclaims any warranty or liability for your use of this information.      "

## 2024-03-06 ENCOUNTER — TELEPHONE (OUTPATIENT)
Dept: INTERNAL MEDICINE | Facility: CLINIC | Age: 64
End: 2024-03-06
Payer: COMMERCIAL

## 2024-03-06 DIAGNOSIS — Z13.6 CARDIOVASCULAR SCREENING; LDL GOAL LESS THAN 160: ICD-10-CM

## 2024-03-06 DIAGNOSIS — E05.90 HYPERTHYROIDISM: ICD-10-CM

## 2024-03-06 DIAGNOSIS — Z13.29 SCREENING FOR THYROID DISORDER: ICD-10-CM

## 2024-03-06 DIAGNOSIS — E78.5 HYPERLIPIDEMIA LDL GOAL <130: Primary | ICD-10-CM

## 2024-03-06 DIAGNOSIS — C34.80 MALIGNANT NEOPLASM OF TRACHEA, BRONCHUS, AND LUNG (H): ICD-10-CM

## 2024-03-06 DIAGNOSIS — D64.9 ANEMIA, UNSPECIFIED TYPE: ICD-10-CM

## 2024-03-06 DIAGNOSIS — C33 MALIGNANT NEOPLASM OF TRACHEA, BRONCHUS, AND LUNG (H): ICD-10-CM

## 2024-03-06 DIAGNOSIS — K21.9 GASTROESOPHAGEAL REFLUX DISEASE WITHOUT ESOPHAGITIS: ICD-10-CM

## 2024-03-06 SDOH — HEALTH STABILITY: PHYSICAL HEALTH: ON AVERAGE, HOW MANY DAYS PER WEEK DO YOU ENGAGE IN MODERATE TO STRENUOUS EXERCISE (LIKE A BRISK WALK)?: 4 DAYS

## 2024-03-06 SDOH — HEALTH STABILITY: PHYSICAL HEALTH: ON AVERAGE, HOW MANY MINUTES DO YOU ENGAGE IN EXERCISE AT THIS LEVEL?: 60 MIN

## 2024-03-06 ASSESSMENT — ASTHMA QUESTIONNAIRES
QUESTION_5 LAST FOUR WEEKS HOW WOULD YOU RATE YOUR ASTHMA CONTROL: SOMEWHAT CONTROLLED
ACT_TOTALSCORE: 19
QUESTION_2 LAST FOUR WEEKS HOW OFTEN HAVE YOU HAD SHORTNESS OF BREATH: ONCE OR TWICE A WEEK
QUESTION_4 LAST FOUR WEEKS HOW OFTEN HAVE YOU USED YOUR RESCUE INHALER OR NEBULIZER MEDICATION (SUCH AS ALBUTEROL): ONCE A WEEK OR LESS
QUESTION_3 LAST FOUR WEEKS HOW OFTEN DID YOUR ASTHMA SYMPTOMS (WHEEZING, COUGHING, SHORTNESS OF BREATH, CHEST TIGHTNESS OR PAIN) WAKE YOU UP AT NIGHT OR EARLIER THAN USUAL IN THE MORNING: ONCE OR TWICE
QUESTION_1 LAST FOUR WEEKS HOW MUCH OF THE TIME DID YOUR ASTHMA KEEP YOU FROM GETTING AS MUCH DONE AT WORK, SCHOOL OR AT HOME: A LITTLE OF THE TIME

## 2024-03-06 ASSESSMENT — SOCIAL DETERMINANTS OF HEALTH (SDOH): HOW OFTEN DO YOU GET TOGETHER WITH FRIENDS OR RELATIVES?: ONCE A WEEK

## 2024-03-06 NOTE — TELEPHONE ENCOUNTER
Patient Returning Call    Reason for call:  Patient is scheduled for annual on 03/11, unsure if labs need to be drawn prior. Please check and advise    Information relayed to patient:  24/48 hrs for a call back    Patient has additional questions:  No      Could we send this information to you in [facility] or would you prefer to receive a phone call?:   Patient would prefer a phone call   Okay to leave a detailed message?: Yes at Cell number on file:    Telephone Information:   Mobile [phone]

## 2024-03-06 NOTE — TELEPHONE ENCOUNTER
It would be helpful if she had fasting labs before her appointment on 3/11 so we can discuss the results, but its not absolutely necessary and can draw the labs at the time of her visit if this is easier.   Future orders placed.     Schedule a fasting lab appointment at any [facility] ([place] or [place]) if possible between now and 3/11, otherwise I will just see her as planned on 3/11/24.    Close encounter when done.

## 2024-03-09 ENCOUNTER — LAB (OUTPATIENT)
Dept: LAB | Facility: CLINIC | Age: 64
End: 2024-03-09
Payer: COMMERCIAL

## 2024-03-09 DIAGNOSIS — E78.5 HYPERLIPIDEMIA LDL GOAL <130: Primary | ICD-10-CM

## 2024-03-09 DIAGNOSIS — Z13.6 CARDIOVASCULAR SCREENING; LDL GOAL LESS THAN 160: ICD-10-CM

## 2024-03-09 DIAGNOSIS — E05.90 HYPERTHYROIDISM: ICD-10-CM

## 2024-03-09 DIAGNOSIS — C33 MALIGNANT NEOPLASM OF TRACHEA, BRONCHUS, AND LUNG (H): ICD-10-CM

## 2024-03-09 DIAGNOSIS — C34.80 MALIGNANT NEOPLASM OF TRACHEA, BRONCHUS, AND LUNG (H): ICD-10-CM

## 2024-03-09 DIAGNOSIS — K21.9 GASTROESOPHAGEAL REFLUX DISEASE WITHOUT ESOPHAGITIS: ICD-10-CM

## 2024-03-09 DIAGNOSIS — Z13.29 SCREENING FOR THYROID DISORDER: ICD-10-CM

## 2024-03-09 DIAGNOSIS — D64.9 ANEMIA, UNSPECIFIED TYPE: ICD-10-CM

## 2024-03-09 LAB
ALBUMIN SERPL BCG-MCNC: 4.5 G/DL (ref 3.5–5.2)
ALP SERPL-CCNC: 58 U/L (ref 40–150)
ALT SERPL W P-5'-P-CCNC: 32 U/L (ref 0–50)
ANION GAP SERPL CALCULATED.3IONS-SCNC: 10 MMOL/L (ref 7–15)
AST SERPL W P-5'-P-CCNC: 26 U/L (ref 0–45)
BILIRUB SERPL-MCNC: 0.4 MG/DL
BUN SERPL-MCNC: 12.5 MG/DL (ref 8–23)
CALCIUM SERPL-MCNC: 9.6 MG/DL (ref 8.8–10.2)
CHLORIDE SERPL-SCNC: 104 MMOL/L (ref 98–107)
CHOLEST SERPL-MCNC: 181 MG/DL
CREAT SERPL-MCNC: 0.88 MG/DL (ref 0.51–0.95)
DEPRECATED HCO3 PLAS-SCNC: 25 MMOL/L (ref 22–29)
EGFRCR SERPLBLD CKD-EPI 2021: 73 ML/MIN/1.73M2
ERYTHROCYTE [DISTWIDTH] IN BLOOD BY AUTOMATED COUNT: 13.3 % (ref 10–15)
FASTING STATUS PATIENT QL REPORTED: YES
GLUCOSE SERPL-MCNC: 115 MG/DL (ref 70–99)
HCT VFR BLD AUTO: 41.1 % (ref 35–47)
HDLC SERPL-MCNC: 68 MG/DL
HGB BLD-MCNC: 13.5 G/DL (ref 11.7–15.7)
LDLC SERPL CALC-MCNC: 87 MG/DL
MCH RBC QN AUTO: 24.4 PG (ref 26.5–33)
MCHC RBC AUTO-ENTMCNC: 32.8 G/DL (ref 31.5–36.5)
MCV RBC AUTO: 74 FL (ref 78–100)
NONHDLC SERPL-MCNC: 113 MG/DL
PLATELET # BLD AUTO: 364 10E3/UL (ref 150–450)
POTASSIUM SERPL-SCNC: 4.4 MMOL/L (ref 3.4–5.3)
PROT SERPL-MCNC: 7.2 G/DL (ref 6.4–8.3)
RBC # BLD AUTO: 5.53 10E6/UL (ref 3.8–5.2)
SODIUM SERPL-SCNC: 139 MMOL/L (ref 135–145)
TRIGL SERPL-MCNC: 132 MG/DL
TSH SERPL DL<=0.005 MIU/L-ACNC: 3.53 UIU/ML (ref 0.3–4.2)
WBC # BLD AUTO: 6.5 10E3/UL (ref 4–11)

## 2024-03-09 PROCEDURE — 80053 COMPREHEN METABOLIC PANEL: CPT

## 2024-03-09 PROCEDURE — 80061 LIPID PANEL: CPT

## 2024-03-09 PROCEDURE — 85027 COMPLETE CBC AUTOMATED: CPT

## 2024-03-09 PROCEDURE — 84443 ASSAY THYROID STIM HORMONE: CPT

## 2024-03-09 PROCEDURE — 36415 COLL VENOUS BLD VENIPUNCTURE: CPT

## 2024-03-11 ENCOUNTER — OFFICE VISIT (OUTPATIENT)
Dept: INTERNAL MEDICINE | Facility: CLINIC | Age: 64
End: 2024-03-11
Payer: COMMERCIAL

## 2024-03-11 VITALS
SYSTOLIC BLOOD PRESSURE: 130 MMHG | OXYGEN SATURATION: 98 % | RESPIRATION RATE: 14 BRPM | DIASTOLIC BLOOD PRESSURE: 70 MMHG | HEART RATE: 98 BPM | TEMPERATURE: 96.8 F | WEIGHT: 122.3 LBS | HEIGHT: 62 IN | BODY MASS INDEX: 22.5 KG/M2

## 2024-03-11 DIAGNOSIS — E78.5 HYPERLIPIDEMIA LDL GOAL <100: ICD-10-CM

## 2024-03-11 DIAGNOSIS — Z13.29 SCREENING FOR THYROID DISORDER: ICD-10-CM

## 2024-03-11 DIAGNOSIS — C34.80 MALIGNANT NEOPLASM OF TRACHEA, BRONCHUS, AND LUNG (H): ICD-10-CM

## 2024-03-11 DIAGNOSIS — J45.30 MILD PERSISTENT ASTHMA WITHOUT COMPLICATION: ICD-10-CM

## 2024-03-11 DIAGNOSIS — A60.04 HERPES SIMPLEX VULVOVAGINITIS: ICD-10-CM

## 2024-03-11 DIAGNOSIS — Z23 HIGH PRIORITY FOR COVID-19 VACCINATION: ICD-10-CM

## 2024-03-11 DIAGNOSIS — Z13.6 CARDIOVASCULAR SCREENING; LDL GOAL LESS THAN 130: ICD-10-CM

## 2024-03-11 DIAGNOSIS — A60.00 RECURRENT GENITAL HSV (HERPES SIMPLEX VIRUS) INFECTION: ICD-10-CM

## 2024-03-11 DIAGNOSIS — C33 MALIGNANT NEOPLASM OF TRACHEA, BRONCHUS, AND LUNG (H): ICD-10-CM

## 2024-03-11 DIAGNOSIS — Z00.01 ENCOUNTER FOR ROUTINE ADULT MEDICAL EXAM WITH ABNORMAL FINDINGS: Primary | ICD-10-CM

## 2024-03-11 PROCEDURE — 99396 PREV VISIT EST AGE 40-64: CPT | Performed by: INTERNAL MEDICINE

## 2024-03-11 RX ORDER — ROSUVASTATIN CALCIUM 10 MG/1
10 TABLET, COATED ORAL DAILY
Qty: 90 TABLET | Refills: 3 | Status: SHIPPED | OUTPATIENT
Start: 2024-03-11

## 2024-03-11 RX ORDER — ALBUTEROL SULFATE 90 UG/1
2 AEROSOL, METERED RESPIRATORY (INHALATION) EVERY 4 HOURS PRN
Qty: 18 G | Refills: 11 | Status: SHIPPED | OUTPATIENT
Start: 2024-03-11

## 2024-03-11 RX ORDER — VALACYCLOVIR HYDROCHLORIDE 500 MG/1
500 TABLET, FILM COATED ORAL DAILY
Qty: 90 TABLET | Refills: 3 | Status: SHIPPED | OUTPATIENT
Start: 2024-03-11

## 2024-03-11 NOTE — PROGRESS NOTES
Preventive Care Visit  [facility]  Gilberto Clifford MD, Internal Medicine  Mar 11, 2024    Assessment & Plan     (Z00.01) Encounter for routine adult medical exam with abnormal findings  (primary encounter diagnosis)  Comment: Discussed cardiac disease risk factor modification including screening, preventing, and treating hypertension, elevated lipids, diabetes, and smoking cessation.    Discussed age appropriate cancer screening recommendations as dictated by age group and past medical history.    Recommended making better food choices as often as possible, including lower carb, lower fat, lower salt diet and moderation in any alcohol intake.    Recommended maintaining regular physical activity/exercise throughout their lifetime.  Recommended safety and injury prevention (I.e. seatbelt use, safety equipment like helmets when biking, etc).    Counseling:  Reviewed preventive health counseling, as reflected in patient instructions       Regular exercise       Healthy diet/nutrition       Vision screening       Hearing screening       Immunizations  up to date per [facility], but most reent covid from Sept potentially not listed.    She will check with [facility] to confirm that she got eh most recent Covid booster when she got her flu  vaccine in Sept, if not, then she will get it.            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   Plan: PRIMARY CARE FOLLOW-UP SCHEDULING, REVIEW OF         HEALTH MAINTENANCE PROTOCOL ORDERS            (J45.30) Mild persistent asthma without complication  Comment: This condition is currently controlled on the current  medical regimen.  Continue current therapy.   Rare use albuterol as needed.   Plan: albuterol (PROAIR HFA/PROVENTIL HFA/VENTOLIN         HFA) 108 (90 Base) MCG/ACT inhaler, PRIMARY         CARE FOLLOW-UP SCHEDULING, REVIEW OF HEALTH         MAINTENANCE PROTOCOL ORDERS            (E78.5) Hyperlipidemia LDL goal <100  Comment: significant improvement in lipids.   She has not experienced any significant side effects of this medication.   Plan: rosuvastatin (CRESTOR) 10 MG tablet, PRIMARY         CARE FOLLOW-UP SCHEDULING, REVIEW OF HEALTH         MAINTENANCE PROTOCOL ORDERS            (A60.04) Herpes simplex vulvovaginitis  Comment: continue vlaacyclovir, either as needed for outbreaks, or potentially once daily as prophylaxis.   Plan: valACYclovir (VALTREX) 500 MG tablet, PRIMARY         CARE FOLLOW-UP SCHEDULING, REVIEW OF HEALTH         MAINTENANCE PROTOCOL ORDERS            (A60.00) Recurrent genital HSV (herpes simplex virus) infection  Comment:   Plan: valACYclovir (VALTREX) 500 MG tablet, PRIMARY         CARE FOLLOW-UP SCHEDULING, REVIEW OF HEALTH         MAINTENANCE PROTOCOL ORDERS            (Z23) High priority for COVID-19 vaccination  Comment:   Plan: PRIMARY CARE FOLLOW-UP SCHEDULING, REVIEW OF         HEALTH MAINTENANCE PROTOCOL ORDERS            (Z13.29) Screening for thyroid disorder  Comment:   Plan: PRIMARY CARE FOLLOW-UP SCHEDULING, REVIEW OF         HEALTH MAINTENANCE PROTOCOL ORDERS            (Z13.6) CARDIOVASCULAR SCREENING; LDL GOAL LESS THAN 130  Comment: Discussed cardiac disease risk factors and cardiac disease risk factor modification.   Plan: PRIMARY CARE FOLLOW-UP SCHEDULING, REVIEW OF         HEALTH MAINTENANCE PROTOCOL ORDERS               Patient has been advised of split billing requirements and indicates understanding: Yes          Counseling  Appropriate preventive services were discussed with this patient, including applicable screening as appropriate for fall prevention, nutrition,  physical activity, Tobacco-use cessation, weight loss and cognition.  Checklist reviewing preventive services available has been given to the patient.  Reviewed patient's diet, addressing concerns and/or questions.           Myron Harris is a 64 year old, presenting for the following:  Physical        3/11/2024    10:46 AM   Additional Questions   Roomed by Ayleen HAN CMA    56}  Health Care Directive  Patient does not have a Health Care Directive or Living Will:     HPI      1.)  Hyperlipidemia:  Has history of hyperlipidemia.    The patient is taking a medication for this.  Denies any significant side effects from his medication.      Latest labs reviewed:    Recent Labs   Lab Test 03/09/24  1053 03/06/23  1043   CHOL 181 297*   HDL 68 70   LDL 87 190*   TRIG 132 187*        Lab Results   Component Value Date    AST 26 03/09/2024    AST 18 10/28/2020         2.)  history of lung cancer 9 nonsmoker) s/p resection 2007.  No evidence for recurrance as recently as last Chest CT of 2021.   No current respiroatory of plmonary sx.     3.)  history of occ herpes outbreaks, few times per year.  Loyd takes valacyclovir as needed for the outbreaks.         3/6/2024   General Health   How would you rate your overall physical health? (!) FAIR   Feel stress (tense, anxious, or unable to sleep) To some extent   (!) STRESS CONCERN      3/6/2024   Nutrition   Three or more servings of calcium each day? Yes   Diet: Regular (no restrictions)   How many servings of fruit and vegetables per day? (!) 2-3   How many sweetened beverages each day? 0-1         3/6/2024   Exercise   Days per week of moderate/strenous exercise 4 days   Average minutes spent exercising at this level 60 min         3/6/2024   Social Factors   Frequency of gathering with friends or relatives Once a week   Worry food won't last until get money to buy more No   Food not last or not have enough money for food? No   Do you have housing?  Yes   Are you  worried about losing your housing? No   Lack of transportation? No   Unable to get utilities (heat,electricity)? No         3/6/2024   Fall Risk   Fallen 2 or more times in the past year? No    No   Trouble with walking or balance? No    No          3/6/2024   Dental   Dentist two times every year? Yes         3/6/2024   TB Screening   Were you born outside of [place]?  (!) YES             Today's PHQ-2 Score:       3/11/2024    10:46 AM   PHQ-2 ( 1999 [employer])   Q1: Little interest or pleasure in doing things 1   Q2: Feeling down, depressed or hopeless 0   PHQ-2 Score 1   Q1: Little interest or pleasure in doing things Several days   Q2: Feeling down, depressed or hopeless Not at all   PHQ-2 Score 1         3/6/2024   Substance Use   Alcohol more than 3/day or more than 7/wk No   Do you use any other substances recreationally? No     Social History     Tobacco Use    Smoking status: Never    Smokeless tobacco: Never   Substance Use Topics    Alcohol use: No    Drug use: No             3/6/2024   Breast Cancer Screening   Family history of breast, colon, or ovarian cancer? No / Unknown         12/12/2023   LAST FHS-7 RESULTS   1st degree relative breast or ovarian cancer No   Any relative bilateral breast cancer No   Any male have breast cancer No   Any ONE woman have BOTH breast AND ovarian cancer No   Any woman with breast cancer before 50yrs No   2 or more relatives with breast AND/OR ovarian cancer No   2 or more relatives with breast AND/OR bowel cancer No        .mammograms up to date         3/6/2024   STI Screening   New sexual partner(s) since last STI/HIV test? No     History of abnormal Pap smear: Status post benign hysterectomy. Health Maintenance and Surgical History updated.        Latest Ref Rng & Units 11/24/2015    10:07 AM 11/24/2015    12:00 AM 7/23/2012     9:33 AM   PAP / HPV   PAP (Historical)   NIL  NIL    HPV 16 DNA NEG Negative      HPV 18 DNA NEG Negative      Other HR HPV NEG Negative     "    ASCVD Risk   The 10-year ASCVD risk score (Lorenzo SAENZ, et al., 2019) is: 4.4%    Values used to calculate the score:      Age: 64 years      Sex: Female      Is Non- : No      Diabetic: No      Tobacco smoker: No      Systolic Blood Pressure: 130 mmHg      Is BP treated: No      HDL Cholesterol: 68 mg/dL      Total Cholesterol: 181 mg/dL           Reviewed and updated as needed this visit by Provider       Med Hx   Fam Hx              **I reviewed the information recorded in the patient's EPIC chart (including but not limited to medical history, surgical history, family history, problem list, medication list, and allergy list) and updated the information as indicated based on the patients reported information.             Review of Systems  Constitutional, neuro, ENT, endocrine, pulmonary, cardiac, gastrointestinal, genitourinary, musculoskeletal, integument and psychiatric systems are negative, except as otherwise noted.     Objective    Exam  /70   Pulse 98   Temp 96.8  F (36  C) (Tympanic)   Resp 14   Ht 1.562 m (5' 1.5\")   Wt 55.5 kg (122 lb 4.8 oz)   LMP 10/11/2006 (Exact Date)   SpO2 98%   Breastfeeding No   BMI 22.73 kg/m     Estimated body mass index is 22.73 kg/m  as calculated from the following:    Height as of this encounter: 1.562 m (5' 1.5\").    Weight as of this encounter: 55.5 kg (122 lb 4.8 oz).    Physical Exam  GENERAL alert and no distress  EYES:  Normal sclera,conjunctiva, EOMI  HENT: oral and posterior pharynx without lesions or erythema, facies symmetric  NECK: Neck supple. No LAD, without thyroidmegaly.  RESP: Clear to ausculation bilaterally without wheezes or crackles. Normal BS in all fields.  CV: RRR normal S1S2 without murmurs, rubs or gallops.  LYMPH: no cervical lymph adenopathy appreciated  MS: extremities- no gross deformities of the visible extremities noted,   EXT:  no lower extremity edema  PSYCH: Alert and oriented times 3; " speech- coherent  SKIN:  No obvious significant skin lesions on visible portions of face       Signed Electronically by: Gilberto Clifford MD

## 2024-03-11 NOTE — PATIENT INSTRUCTIONS
"     Continue all medications at the same doses.  Contact your usual pharmacy if you need refills.      Return to see me in 1 year, sooner if needed.  Please get fasting labs done at the [facility] or any other [facility] Lab lab 1-2 days before this appointment (schedule a \"lab appointment\" for this).  If you get the labs done at another [facility], make arrangements with them directly.  The orders will be in place.  Eat nothing for at least 8 hours prior to having these labs drawn.  Use [employer] or Call [phone] to schedule the appointment with me and lab appointment.         CERUMEN IMPACTION (EXCESSIVE EAR WAX BUILD UP):       *  You had a fair amount of wax in the ear canal.     *  Wax softening ear drops (e.g. Debrox or mineral oil) into the affected ear canal, let it sit for 15 minutes, then irrigate with the syringe as below.     *  In the future, never use Q-tips or swabs to try and clean out your ear canal as you will risk injury to the ear canal or ear drum and you will do nothing more than just push the wax in deeper into the erar canal.     *  If you have future problems with ear wax build up, perform regular ear canal irrigation using either a \"bulb syringe\" from any drug store or the \"elephant ear wash kit\" (available on [employer]) and perform your own ear canal washes every 2-3 months if needed.  Use luke warm water ( not too hot, not too cold)            For more vigorous irrigation:  consider Elephant Bottle Ear Wash system (get from [employer]):        5 GOALS TO PREVENT VASCULAR DISEASE:     1.  Aggressive blood pressure control, under 130/80 ideally.  Using medications if needed.    Your blood pressure is under good control    BP Readings from Last 4 Encounters:   03/11/24 130/70   03/06/23 120/66   11/01/22 136/82   12/20/21 126/87       2.  Aggressive LDL cholesterol (\"bad cholesterol\") lowering as indicated.    Your goal is an LDL under 130 for sure, preferably under 100.  " (If you have diabetes or previous vascular disease, the the LDL goals would be under 100 for sure, preferably under 70.)    New guidelines identify four high-risk groups who could benefit from statins:   *people with pre-existing heart disease, such as those who have had a heart attack;   *people ages 40 to 75 who have diabetes of any type  *patients ages 40 to 75 with at least a 7.5% risk of developing cardiovascular disease over the next decade, according to a formula described in the guidelines  *patients with the sort of super-high cholesterol that sometimes runs in families, as evidenced by an LDL of 190 milligrams per deciliter or higher    Your cholesterol levels are well controlled.    Recent Labs   Lab Test 03/09/24  1053 03/06/23  1043   CHOL 181 297*   HDL 68 70   LDL 87 190*   TRIG 132 187*       3.  Aggressive diabetic prevention, screening and/or management.      You do not have diabetes as of the most recent blood tests.     4.  No smoking    5.  Consider daily preventative aspirin over age 50 if you have enough cardiac risk factors to place you at higher risk for the presence of vascular disease.    If you have any reason not to take aspirin such easy bruising or bleeding, stomach problems, other anticoagulant medications, or any other side effects, then you should not take Aspirin.     --Based on your current cardiac disease risk profile and/or age over 75, you do NOT need to take daily preventative aspirin.          Preventive Health Recommendations  Female Ages 26 to 45    Yearly exam:   See your health care provider every year in order to  Review health changes in your health history or your family's medical history  Discuss preventive care.    Reevaluate and reassess any ongoing chronic medical issues  Review and renew any prescription medicines, if you require regular prescriptions.  Identify and aggressively manage any underlying vascular disease risk factors    Get a Pap test every 3-5  years  "(unless you have an abnormal result and your provider advises testing more often or have had a hysterectomy).    If you get Pap tests with HPV test, you only need to test every 5 years, unless you have an abnormal result. You do not need a Pap test if your uterus was removed (hysterectomy) and you have not had cancer.    You should be tested each year for STDs (sexually transmitted diseases), if you're at risk.     Regular mammograms starting approximately age 40, recommended annually between ages 45-55, then every 1 to 2 years.    Routine colon cancer screening normally starts at age 45 unless, but should start earlier at age 35 if someone in your family has had colon cancer below age 50.     Have a cholesterol test at least every 5 years.     Have a diabetes test (fasting glucose) at least every 2 to 3 years. If you are at risk for diabetes, you should have this test every years.    Shots: Get a flu shot each year. Get a tetanus shot every 10 years.     Nutrition:   Eat at least 5 servings of fruits and vegetables each day.  Eat and brown rice instead of white grains and rice.  Talk to your provider about Calcium and Vitamin D.        --Good Grains:  Oats, brown rice, Quinoa (these do not raise the blood sugar as much)     --Bad grains:  Anything made from wheat or white rice     (because these raise the blood sugars significantly, and the possible gluten issue from wheat for some people).      --Proteins:  Aim for \"lean proteins\" including chicken, fish, seafood, pork, turkey, and eggs (in moderation); Eat red meat only occasionally        Lifestyle  Exercise at least 150 minutes a week (an average of 30 minutes a day, 5 days a week). This will help you control your weight and prevent disease.  Limit alcohol to one drink per day.  No smoking.   Wear sunscreen to prevent skin cancer.  See your dentist every six months for an exam and cleaning.         "

## 2024-03-12 ASSESSMENT — ASTHMA QUESTIONNAIRES: ACT_TOTALSCORE: 20

## 2024-04-15 ENCOUNTER — E-VISIT (OUTPATIENT)
Dept: INTERNAL MEDICINE | Facility: CLINIC | Age: 64
End: 2024-04-15
Payer: COMMERCIAL

## 2024-04-15 DIAGNOSIS — H10.31 ACUTE CONJUNCTIVITIS OF RIGHT EYE, UNSPECIFIED ACUTE CONJUNCTIVITIS TYPE: Primary | ICD-10-CM

## 2024-04-15 PROCEDURE — 99421 OL DIG E/M SVC 5-10 MIN: CPT | Performed by: INTERNAL MEDICINE

## 2024-04-15 RX ORDER — POLYMYXIN B SULFATE AND TRIMETHOPRIM 1; 10000 MG/ML; [USP'U]/ML
SOLUTION OPHTHALMIC
Qty: 10 ML | Refills: 0 | Status: SHIPPED | OUTPATIENT
Start: 2024-04-15 | End: 2024-04-22

## 2024-04-16 NOTE — PATIENT INSTRUCTIONS
Thank you for choosing us for your care. I have placed an order for a prescription so that you can start treatment. View your full visit summary for details by clicking on the link below. Your pharmacist will able to address any questions you may have about the medication.     If you re not feeling better within 2-3 days, please schedule an appointment.  You can schedule an appointment right here in [facility], or call [phone]  If the visit is for the same symptoms as your eVisit, we ll refund the cost of your eVisit if seen within seven days.    Pinkeye: Care Instructions  Overview     Pinkeye is redness and swelling of the eye surface and the conjunctiva (the lining of the eyelid and the covering of the white part of the eye). Pinkeye is also called conjunctivitis. Pinkeye is often caused by infection with bacteria or a virus. Dry air, allergies, smoke, and chemicals are other common causes.  Pinkeye often gets better on its own in 7 to 10 days. Antibiotics only help if the pinkeye is caused by bacteria. Pinkeye caused by infection spreads easily. If an allergy or chemical is causing pinkeye, it will not go away unless you can avoid whatever is causing it.  Follow-up care is a key part of your treatment and safety. Be sure to make and go to all appointments, and call your doctor if you are having problems. It's also a good idea to know your test results and keep a list of the medicines you take.  How can you care for yourself at home?  Wash your hands often. Always wash them before and after you treat pinkeye or touch your eyes or face.  Use moist cotton or a clean, wet cloth to remove crust. Wipe from the inside corner of the eye to the outside. Use a clean part of the cloth for each wipe.  Put cold or warm wet cloths on your eye a few times a day if the eye hurts.  Do not wear contact lenses or eye makeup until the pinkeye is gone. Throw away any eye makeup you were using when you got pinkeye. Clean your  "contacts and storage case. If you wear disposable contacts, use a new pair when your eye has cleared and it is safe to wear contacts again.  If the doctor gave you antibiotic ointment or eyedrops, use them as directed. Use the medicine for as long as instructed, even if your eye starts looking better soon. Keep the bottle tip clean, and do not let it touch the eye area.  To put in eyedrops or ointment:  Tilt your head back, and pull your lower eyelid down with one finger.  Drop or squirt the medicine inside the lower lid.  Close your eye for 30 to 60 seconds to let the drops or ointment move around.  Do not touch the ointment or dropper tip to your eyelashes or any other surface.  Do not share towels, pillows, or washcloths while you have pinkeye.  When should you call for help?   Call your doctor now or seek immediate medical care if:    You have pain in your eye, not just irritation on the surface.     You have a change in vision or loss of vision.     You have an increase in discharge from the eye.     Your eye has not started to improve or begins to get worse within 48 hours after you start using antibiotics.     Pinkeye lasts longer than 7 days.   Watch closely for changes in your health, and be sure to contact your doctor if you have any problems.  Where can you learn more?  Go to [email or web address]/patiented  Enter Y392 in the search box to learn more about \"Pinkeye: Care Instructions.\"  Current as of: June 5, 2023               Content Version: 14.0    7830-5800 [employer].   Care instructions adapted under license by your healthcare professional. If you have questions about a medical condition or this instruction, always ask your healthcare professional. [employer] disclaims any warranty or liability for your use of this information.      "

## 2024-11-12 ENCOUNTER — PATIENT OUTREACH (OUTPATIENT)
Dept: CARE COORDINATION | Facility: CLINIC | Age: 64
End: 2024-11-12
Payer: COMMERCIAL

## 2024-12-02 ENCOUNTER — TELEPHONE (OUTPATIENT)
Dept: INTERNAL MEDICINE | Facility: CLINIC | Age: 64
End: 2024-12-02
Payer: COMMERCIAL

## 2024-12-02 NOTE — TELEPHONE ENCOUNTER
"Swollen knuckle of middle and ring fingers right hand. Left hand tip of ring finger hurts also but only when she hits her hand on something hard. Symptoms off and on x \"months\". Celebrex helps temporarily. Appt scheduled with PCP.     Heaven Pierce RN      "

## 2024-12-06 ASSESSMENT — ASTHMA QUESTIONNAIRES
QUESTION_3 LAST FOUR WEEKS HOW OFTEN DID YOUR ASTHMA SYMPTOMS (WHEEZING, COUGHING, SHORTNESS OF BREATH, CHEST TIGHTNESS OR PAIN) WAKE YOU UP AT NIGHT OR EARLIER THAN USUAL IN THE MORNING: ONCE OR TWICE
QUESTION_4 LAST FOUR WEEKS HOW OFTEN HAVE YOU USED YOUR RESCUE INHALER OR NEBULIZER MEDICATION (SUCH AS ALBUTEROL): NOT AT ALL
QUESTION_2 LAST FOUR WEEKS HOW OFTEN HAVE YOU HAD SHORTNESS OF BREATH: ONCE OR TWICE A WEEK
QUESTION_5 LAST FOUR WEEKS HOW WOULD YOU RATE YOUR ASTHMA CONTROL: WELL CONTROLLED
ACT_TOTALSCORE: 22
ACT_TOTALSCORE: 22
QUESTION_1 LAST FOUR WEEKS HOW MUCH OF THE TIME DID YOUR ASTHMA KEEP YOU FROM GETTING AS MUCH DONE AT WORK, SCHOOL OR AT HOME: NONE OF THE TIME

## 2024-12-09 ENCOUNTER — VIRTUAL VISIT (OUTPATIENT)
Dept: INTERNAL MEDICINE | Facility: CLINIC | Age: 64
End: 2024-12-09
Payer: COMMERCIAL

## 2024-12-09 DIAGNOSIS — M19.041 OSTEOARTHRITIS OF FINGER OF RIGHT HAND: Primary | ICD-10-CM

## 2024-12-09 DIAGNOSIS — J45.30 MILD PERSISTENT ASTHMA WITHOUT COMPLICATION: ICD-10-CM

## 2024-12-09 DIAGNOSIS — E78.5 HYPERLIPIDEMIA LDL GOAL <100: ICD-10-CM

## 2024-12-09 DIAGNOSIS — Z13.6 CARDIOVASCULAR SCREENING; LDL GOAL LESS THAN 160: ICD-10-CM

## 2024-12-09 PROCEDURE — G2211 COMPLEX E/M VISIT ADD ON: HCPCS | Mod: 95 | Performed by: INTERNAL MEDICINE

## 2024-12-09 PROCEDURE — 99213 OFFICE O/P EST LOW 20 MIN: CPT | Mod: 95 | Performed by: INTERNAL MEDICINE

## 2024-12-09 NOTE — PATIENT INSTRUCTIONS
OSTEOARTHRITIS:  ==========================    wear and tear arthritis    *  If symptoms occur regularly, then try Tylenol extra strength (generic acetominophen OK as well) 2 tablet twice per day, every day.    *  Also can try over-the-counter glucosamine Chondroitin, any preparation according to the directions on the bottle.  If it does not help or seem to do muhc after 6-8 weeks, then stop it.    *  If you still have symptoms despite tylenol use, then try Motrin, ADvil, or celecoxib (Celebrex once or twice a day as needed based on symptoms.)     Always take with food to minimize GI upset and to immediately stop if any side effects.      Potential side effects including GI bleed, GI irritation, diarrhea, renal dysfunction, bruising/bleeding.    Do not take any OTC antiinflammatory medications if you have had any trobles with ulcers, bleeding or sever bruising problems or are on COumadin or other blood thinners.       Follow-up with me  in approximately March 2025

## 2024-12-09 NOTE — PROGRESS NOTES
Kimberly is a 64 year old who is being evaluated via a billable video visit.    How would you like to obtain your AVS? MyChart  If the video visit is dropped, the invitation should be reset: [email or web address]  Will anyone else be joining your video visit? -        Assessment & Plan     (M19.041) Osteoarthritis of finger of right hand  (primary encounter diagnosis)  Comment: Based on the description and review of her fingers, this sounds like simple osteoarthritis of her fingers.  At this point no evidence for more serious inflammatory arthropathy.    Discussed the pathophysiology, typical presentations and basic management principles of osteoarthritis with the pt.  If they have not already tried scheduled Tylenol dosing, then will start there. IF that did not work then will progress with prn or scheuled OTC NSAIDs.  If NSAIDs contraindicated or cause GI side effects, then will move to JORDAN II agents.    Consider trial of over-the-counter glucosamine/chondroitin.  Plan: ESR: Erythrocyte sedimentation rate, CRP         inflammation            (E78.5) Hyperlipidemia LDL goal <100  Comment: Rosuvastatin nicely lowering the LDL.  Recheck labs at her next visit in 3 months.  Plan: Lipid panel reflex to direct LDL Fasting, Basic        metabolic panel, CBC with platelets, AST, ALT            (Z13.6) CARDIOVASCULAR SCREENING; LDL GOAL LESS THAN 160  Comment: Recheck labs at her next visit, future orders placed.  Plan: Lipid panel reflex to direct LDL Fasting, Basic        metabolic panel, CBC with platelets, AST, ALT            (J45.30) Mild persistent asthma without complication  Comment: This condition is currently controlled on the current medical regimen.  Continue current therapy.   Plan:                      Subjective     Kimberly is a 64 year old, presenting for the following health issues:  Musculoskeletal Problem (Finger swelling)        12/9/2024     8:57 AM   Additional Questions   Roomed by Ayleen HAN CMA  "        History of Present Illness       Reason for visit:  My middle finger and ring finger onmy right hand side on the knuckles area got swalllen  Symptom onset:  3-4 weeks ago  Symptoms include:  Stiff and feel tender to touch  Symptom intensity:  Moderate  Symptom progression:  Staying the same  Had these symptoms before:  No  What makes it worse:  Very stiff when getting up in the morning  What makes it better:  Taking Celebrex and movement   She is taking medications regularly.     Swelling in the right third and fourth PIP joints over the past several months, possibly longer.  More noticeable over the last several weeks.  No specific injury.  No specific overuse.  She is right-hand dominant.  Has small \"lumps\" at the very distal joint on 2 of the fingers on the left hand.  No other joints affected, no specific pains in the larger joints like the knees elbows wrists and hips.  No prior history of rheumatological disease.    Asthma stable.  Has not had any recent breathing troubles beyond usual baseline.  Has not any recent acute respiratory events.  Using medication as directed with reported side effects        3/6/2023     9:51 AM 3/6/2024     9:42 AM 12/6/2024     4:11 PM   ACT Total Scores   ACT TOTAL SCORE (Goal Greater than or Equal to 20) 23 20 22    In the past 12 months, how many times did you visit the emergency room for your asthma without being admitted to the hospital? 0 0  0    In the past 12 months, how many times were you hospitalized overnight because of your asthma? 0 0  0        Patient-reported          **I reviewed the information recorded in the patient's EPIC chart (including but not limited to medical history, surgical history, family history, problem list, medication list, and allergy list) and updated the information as indicated based on the patients reported information.         Review of Systems  Constitutional, HEENT, cardiovascular, pulmonary, gi and gu systems are negative, " "except as otherwise noted.      Objective    Vitals - Patient Reported  Weight (Patient Reported): 61.2 kg (135 lb)  Height (Patient Reported): 154.9 cm (5' 1\")  BMI (Based on Pt Reported Ht/Wt): 25.51  Pain Score: No Pain (0)  Pain Loc: Other - see comment (R>Lhands,both knees, buttocks)      Vitals:  No vitals were obtained today due to virtual visit.    Physical Exam   GENERAL: alert and no distress  EYES: Eyes grossly normal to inspection.  No discharge or erythema, or obvious scleral/conjunctival abnormalities.  RESP: No audible wheeze, cough, or visible cyanosis.    SKIN: Visible skin clear. No significant rash, abnormal pigmentation or lesions.  NEURO: Cranial nerves grossly intact.  Mentation and speech appropriate for age.  PSYCH: Appropriate affect, tone, and pace of words          Video-Visit Details    Type of service:  Video Visit     Joined the call at 12/9/2024, 11:41:40 am.  Left the call at 12/9/2024, 12:04:44 pm.  You were on the call for 23 minutes 3 seconds .    Originating Location (pt. Location): Home    Distant Location (provider location):  On-site  Platform used for Video Visit: [employer]      The longitudinal plan of care for the diagnosis(es)/condition(s) as documented were addressed during this visit. Due to the added complexity in care, I will continue to support Kimberly in the subsequent management and with ongoing continuity of care.    Signed Electronically by: Gilberto Clifford MD    "

## 2024-12-13 ENCOUNTER — HOSPITAL ENCOUNTER (OUTPATIENT)
Dept: MAMMOGRAPHY | Facility: CLINIC | Age: 64
Discharge: HOME | End: 2024-12-13
Attending: INTERNAL MEDICINE | Admitting: INTERNAL MEDICINE
Payer: COMMERCIAL

## 2024-12-13 DIAGNOSIS — Z12.31 VISIT FOR SCREENING MAMMOGRAM: ICD-10-CM

## 2024-12-13 PROCEDURE — 77067 SCR MAMMO BI INCL CAD: CPT

## 2024-12-13 PROCEDURE — 77063 BREAST TOMOSYNTHESIS BI: CPT

## 2024-12-23 DIAGNOSIS — M53.3 SACROILIAC PAIN: ICD-10-CM

## 2024-12-23 RX ORDER — CELECOXIB 200 MG/1
CAPSULE ORAL
Qty: 60 CAPSULE | Refills: 11 | Status: SHIPPED | OUTPATIENT
Start: 2024-12-23

## 2025-02-10 ENCOUNTER — PATIENT OUTREACH (OUTPATIENT)
Dept: CARE COORDINATION | Facility: CLINIC | Age: 65
End: 2025-02-10
Payer: COMMERCIAL

## 2025-02-10 ENCOUNTER — TELEPHONE (OUTPATIENT)
Dept: INTERNAL MEDICINE | Facility: CLINIC | Age: 65
End: 2025-02-10
Payer: COMMERCIAL

## 2025-02-10 DIAGNOSIS — E05.90 HYPERTHYROIDISM: ICD-10-CM

## 2025-02-10 DIAGNOSIS — K21.9 GASTROESOPHAGEAL REFLUX DISEASE WITHOUT ESOPHAGITIS: ICD-10-CM

## 2025-02-10 DIAGNOSIS — Z13.6 CARDIOVASCULAR SCREENING; LDL GOAL LESS THAN 160: ICD-10-CM

## 2025-02-10 DIAGNOSIS — E78.5 HYPERLIPIDEMIA LDL GOAL <130: Primary | ICD-10-CM

## 2025-02-10 NOTE — TELEPHONE ENCOUNTER
Reason for Call:  Appointment Request    Patient requesting this type of appt:  Preventive     Requested provider: Gilberto Clifford    Reason patient unable to be scheduled: Not within requested timeframe    When does patient want to be seen/preferred time:  before april    Comments: Patient has an appointment in April but would like to see primary before that date if possible    Could we send this information to you in [employer] or would you prefer to receive a phone call?:   Patient would like to be contacted via [employer]    Call taken on 2/10/2025 at 1:06 PM by Joann Chapin

## 2025-02-12 NOTE — TELEPHONE ENCOUNTER
"I have to wait until march to see her sine her lat physical war early March 2024.     Ok to schedule her for an annual physical in early March, OK to use any open same day, next day, or virtual release appointment spot.   Also, please get fasting labs in a \"lab only\" appointment at any [facility] location a couple of days before so we can review the results at the time she is seen.   Future orders placed.       Close encounter when done.       "

## 2025-02-12 NOTE — TELEPHONE ENCOUNTER
There is no real reason to need an annual Medicare wellness exam any earlier.   However, schedule her into an appointment in last week of February if she feels she absolutely must be seen earlier for this Medicare wellness exam.    Have her get fasting labs a couple days before his appointment so we can review them at the time she is seen.      Close encounter when done.

## 2025-02-12 NOTE — TELEPHONE ENCOUNTER
Patient has medicare now this year and this is new as of this year 2025. So she can have annual earlier

## 2025-02-12 NOTE — TELEPHONE ENCOUNTER
Left message informing patient to call back for assistance in getting a sooner appointment if needed. Please assist patient when calling back

## 2025-03-27 DIAGNOSIS — E78.5 HYPERLIPIDEMIA LDL GOAL <100: ICD-10-CM

## 2025-03-27 RX ORDER — ROSUVASTATIN CALCIUM 10 MG/1
10 TABLET, COATED ORAL DAILY
Qty: 90 TABLET | Refills: 3 | Status: SHIPPED | OUTPATIENT
Start: 2025-03-27

## 2025-04-01 SDOH — HEALTH STABILITY: PHYSICAL HEALTH: ON AVERAGE, HOW MANY MINUTES DO YOU ENGAGE IN EXERCISE AT THIS LEVEL?: 20 MIN

## 2025-04-01 SDOH — HEALTH STABILITY: PHYSICAL HEALTH: ON AVERAGE, HOW MANY DAYS PER WEEK DO YOU ENGAGE IN MODERATE TO STRENUOUS EXERCISE (LIKE A BRISK WALK)?: 3 DAYS

## 2025-04-01 ASSESSMENT — SOCIAL DETERMINANTS OF HEALTH (SDOH): HOW OFTEN DO YOU GET TOGETHER WITH FRIENDS OR RELATIVES?: ONCE A WEEK

## 2025-04-02 ENCOUNTER — OFFICE VISIT (OUTPATIENT)
Dept: INTERNAL MEDICINE | Facility: CLINIC | Age: 65
End: 2025-04-02
Payer: COMMERCIAL

## 2025-04-02 VITALS
HEART RATE: 96 BPM | BODY MASS INDEX: 22.8 KG/M2 | DIASTOLIC BLOOD PRESSURE: 64 MMHG | TEMPERATURE: 97.2 F | RESPIRATION RATE: 24 BRPM | WEIGHT: 123.9 LBS | SYSTOLIC BLOOD PRESSURE: 132 MMHG | OXYGEN SATURATION: 100 % | HEIGHT: 62 IN

## 2025-04-02 DIAGNOSIS — C34.80 MALIGNANT NEOPLASM OF OVERLAPPING SITES OF UNSPECIFIED BRONCHUS AND LUNG (H): ICD-10-CM

## 2025-04-02 DIAGNOSIS — E78.5 HYPERLIPIDEMIA LDL GOAL <130: ICD-10-CM

## 2025-04-02 DIAGNOSIS — Z13.6 CARDIOVASCULAR SCREENING; LDL GOAL LESS THAN 160: ICD-10-CM

## 2025-04-02 DIAGNOSIS — Z23 NEED FOR IMMUNIZATION AGAINST MEASLES: ICD-10-CM

## 2025-04-02 DIAGNOSIS — Z11.59 SCREENING FOR MEASLES: ICD-10-CM

## 2025-04-02 DIAGNOSIS — Z00.00 MEDICARE ANNUAL WELLNESS VISIT, SUBSEQUENT: Primary | ICD-10-CM

## 2025-04-02 DIAGNOSIS — K21.9 GASTROESOPHAGEAL REFLUX DISEASE WITHOUT ESOPHAGITIS: ICD-10-CM

## 2025-04-02 DIAGNOSIS — Z12.31 VISIT FOR SCREENING MAMMOGRAM: ICD-10-CM

## 2025-04-02 DIAGNOSIS — E05.90 HYPERTHYROIDISM: ICD-10-CM

## 2025-04-02 DIAGNOSIS — M19.041 OSTEOARTHRITIS OF FINGER OF RIGHT HAND: ICD-10-CM

## 2025-04-02 LAB
ALBUMIN SERPL BCG-MCNC: 4.3 G/DL (ref 3.5–5.2)
ALP SERPL-CCNC: 60 U/L (ref 40–150)
ALT SERPL W P-5'-P-CCNC: 45 U/L (ref 0–50)
ANION GAP SERPL CALCULATED.3IONS-SCNC: 11 MMOL/L (ref 7–15)
AST SERPL W P-5'-P-CCNC: 34 U/L (ref 0–45)
BILIRUB SERPL-MCNC: 0.2 MG/DL
BUN SERPL-MCNC: 13.7 MG/DL (ref 8–23)
CALCIUM SERPL-MCNC: 9.3 MG/DL (ref 8.8–10.4)
CHLORIDE SERPL-SCNC: 105 MMOL/L (ref 98–107)
CHOLEST SERPL-MCNC: 190 MG/DL
CREAT SERPL-MCNC: 0.94 MG/DL (ref 0.51–0.95)
CRP SERPL-MCNC: <3 MG/L
EGFRCR SERPLBLD CKD-EPI 2021: 67 ML/MIN/1.73M2
ERYTHROCYTE [DISTWIDTH] IN BLOOD BY AUTOMATED COUNT: 12.9 % (ref 10–15)
ERYTHROCYTE [SEDIMENTATION RATE] IN BLOOD BY WESTERGREN METHOD: 15 MM/HR (ref 0–30)
FASTING STATUS PATIENT QL REPORTED: NO
FASTING STATUS PATIENT QL REPORTED: NO
GLUCOSE SERPL-MCNC: 90 MG/DL (ref 70–99)
HCO3 SERPL-SCNC: 28 MMOL/L (ref 22–29)
HCT VFR BLD AUTO: 38.1 % (ref 35–47)
HDLC SERPL-MCNC: 67 MG/DL
HGB BLD-MCNC: 12.8 G/DL (ref 11.7–15.7)
LDLC SERPL CALC-MCNC: 90 MG/DL
MCH RBC QN AUTO: 24.8 PG (ref 26.5–33)
MCHC RBC AUTO-ENTMCNC: 33.6 G/DL (ref 31.5–36.5)
MCV RBC AUTO: 74 FL (ref 78–100)
NONHDLC SERPL-MCNC: 123 MG/DL
PLATELET # BLD AUTO: 349 10E3/UL (ref 150–450)
POTASSIUM SERPL-SCNC: 4.4 MMOL/L (ref 3.4–5.3)
PROT SERPL-MCNC: 7.3 G/DL (ref 6.4–8.3)
RBC # BLD AUTO: 5.17 10E6/UL (ref 3.8–5.2)
RHEUMATOID FACT SERPL-ACNC: <10 IU/ML
SODIUM SERPL-SCNC: 144 MMOL/L (ref 135–145)
TRIGL SERPL-MCNC: 167 MG/DL
TSH SERPL DL<=0.005 MIU/L-ACNC: 2.86 UIU/ML (ref 0.3–4.2)
WBC # BLD AUTO: 7.5 10E3/UL (ref 4–11)

## 2025-04-02 PROCEDURE — 36415 COLL VENOUS BLD VENIPUNCTURE: CPT | Performed by: INTERNAL MEDICINE

## 2025-04-02 PROCEDURE — 84443 ASSAY THYROID STIM HORMONE: CPT | Performed by: INTERNAL MEDICINE

## 2025-04-02 PROCEDURE — 86762 RUBELLA ANTIBODY: CPT | Performed by: INTERNAL MEDICINE

## 2025-04-02 PROCEDURE — 80053 COMPREHEN METABOLIC PANEL: CPT | Performed by: INTERNAL MEDICINE

## 2025-04-02 PROCEDURE — 86431 RHEUMATOID FACTOR QUANT: CPT | Performed by: INTERNAL MEDICINE

## 2025-04-02 PROCEDURE — 85027 COMPLETE CBC AUTOMATED: CPT | Performed by: INTERNAL MEDICINE

## 2025-04-02 PROCEDURE — 80061 LIPID PANEL: CPT | Performed by: INTERNAL MEDICINE

## 2025-04-02 PROCEDURE — 86765 RUBEOLA ANTIBODY: CPT | Performed by: INTERNAL MEDICINE

## 2025-04-02 PROCEDURE — 85652 RBC SED RATE AUTOMATED: CPT | Performed by: INTERNAL MEDICINE

## 2025-04-02 PROCEDURE — 86140 C-REACTIVE PROTEIN: CPT | Performed by: INTERNAL MEDICINE

## 2025-04-02 ASSESSMENT — PAIN SCALES - GENERAL: PAINLEVEL_OUTOF10: MODERATE PAIN (4)

## 2025-04-02 NOTE — PATIENT INSTRUCTIONS
"     We are rechecking your labs.  I will let you know if the results indicate any changes in your therapy.  Unless you hear otherwise, continue all medications at the same doses.  Contact your usual pharmacy if you need refills.     Assuming your labs look good, then continue all medications at the same doses.  Contact your usual pharmacy if you need refills.      Pneumococcal vaccine next due fall 2026     Flu and Covid vaccines every fall.      Mammogram due in December 2025     Checking for the presence of immunity against measles.     Anyone born before 1956 is assumed to have active immunity against measles because it was so prevalent at that time.   Anyone receiving measles vaccines after 1969 received an adequate dose.   Anyone receiving measles vaccines between 1956 and 1969 may need updated measles vaccines because the measles vaccines given in those years was much less potent.     --If the measles antibody test is positive, then you have active immunity and do not   --If the measles antibody test is negative, then you do not have active immunity and will need an updated measles vaccine.        Assuming all labs look good, Return to see me in 1 year, schedule a follow up visit sooner if needed for anything else.  Use [employer] or Call [phone] to schedule the appointment with me.         5 GOALS TO PREVENT VASCULAR DISEASE:     1.  Aggressive blood pressure control, under 130/80 ideally.  Using medications if needed.    Your blood pressure is under good control    BP Readings from Last 4 Encounters:   04/02/25 132/64   03/11/24 130/70   03/06/23 120/66   11/01/22 136/82       2.  Aggressive LDL cholesterol (\"bad cholesterol\") lowering as indicated.    Your goal is an LDL under 130 for sure, preferably under 100.  (If you have diabetes or previous vascular disease, the the LDL goals would be under 100 for sure, preferably under 70.)    New guidelines identify four high-risk groups who could benefit from " "statins:   *people with pre-existing heart disease, such as those who have had a heart attack;   *people ages 40 to 75 who have diabetes of any type  *patients ages 40 to 75 with at least a 7.5% risk of developing cardiovascular disease over the next decade, according to a formula described in the guidelines  *patients with the sort of super-high cholesterol that sometimes runs in families, as evidenced by an LDL of 190 milligrams per deciliter or higher    Your cholesterol levels are well controlled.    Recent Labs   Lab Test 03/09/24  1053 03/06/23  1043   CHOL 181 297*   HDL 68 70   LDL 87 190*   TRIG 132 187*       --LDL (\"bad\" cholesterol): normal under 130, ideal under 100.  Best way to lower this is through better food choices ( lower fats, etc.), medication may be considered if indicated  --HDL (\"good\") cholesterol: (normal above 40 for men, above 50 for women).  Best way to improve the HDL level is through regular physical exercise.  There are no medications to raise HDL levels.   --Triglycerides (desirable under 150): triglycerides are more about metabolic issues, best way to improve this is through better diet choices, emphasizing reducing the intake of \"simple carbohydrates\" (e.g. White bread, white rice, pasta, noodles, potatoes, snack foods, regular soda, juices (except fresh squeezed), cakes, cookies, candy, etc.) as best possible. Medications may be considered for triglyceride levels routinely above 400.    3.  Aggressive diabetic prevention, screening and/or management.      You do not have diabetes as of the most recent blood tests.     4.  No smoking    5.  Consider daily preventative aspirin over age 50 if you have enough cardiac risk factors to place you at higher risk for the presence of vascular disease.    If you have any reason not to take aspirin such easy bruising or bleeding, stomach problems, other anticoagulant medications, or any other side effects, then you should not take " Aspirin.     --Based on your current cardiac disease risk profile and/or age over 75, you do NOT need to take daily preventative aspirin.          Preventive Health Recommendations  Female Ages 65 -75    Yearly exam: See your health care provider every year in order to  Review health changes in your health history or your family medical history.  Discuss preventive care.    Review and reevaluate any chronic medical conditions  Review and renew any prescription medications, if you take prescription medicines.  Review and aggressively manage any significant cardiac risk factors  Make sure routine cancer screenings are up-to-date    Get a Pap test every three years (unless you have an abnormal result and your provider advises testing more often).  If you get Pap tests with HPV test, you only need to test every 5 years, unless you have an abnormal result.   You do not need a Pap test if your uterus was removed (hysterectomy) and you have not had cancer.  You should be tested each year for STDs (sexually transmitted diseases) if you're at risk.   Have a mammogram every 1 to 2 years until approximately age 75, at that time we can consider whether or not continued mammography screening is still indicated.  Regular colon cancer screening starting age 45 with either a colonoscopy , or stool test (either Cologuard every 3 years or yearly FIT stool test from us).  Have a cholesterol test every 5 years, or more often if advised.  Have a diabetes test (fasting glucose) at least every three years. If you are at risk for diabetes, you should have this test more often.   If you are at risk for osteoporosis (brittle bone disease), think about having a bone density scan (DEXA).  Consider routine lung cancer screening with low-dose chest CT scan if you have a significant smoking history.        Vaccine recommendations:   Get a flu shot each fall.  Middle October is the optimal time to receive the flu shot.   Covid vaccines are now  recommended annually.  Get the most updated Covid vaccine when it becomes available, consider getting this at the same time as the annual influenza vaccine.   Get a tetanus shot every 10 years. (Get this vaccine from a pharmacist in a pharmacy when you are over 65 on Medicare insurance)  Everyone over 65 should make sure to receive pneumonia vaccines to prevent the most common type of bacterial pneumonia: Pneumococcal pneumonia. There are now two you should receive - Pneumovax (PPSV 23) and Prevnar (PCV 20)  Strongly consider the Shingrix shingles vaccine to give you the best chance of avoiding future shingles infection (as many as 1 and 3 adults over age 50 may develop this condition in their lifetime).      --If you have Medicare insurance, investigate the cost and coverage for Shingrix shingles vaccines with a pharmacist at a pharmacy.  They can tell you the coverage and cost and then give it to you if the price is acceptable.    --Medicare sometimes does not cover these Shingrix shingles vaccines, and with Medicare insurance it is usually cheaper to receive this shingles vaccine from a pharmacist in a pharmacy rather than in our clinic.    --At this time, you only need the 2 Shingrix vaccines and then you are done.    Consider vaccination against Respiratory Syncytial Virus (RSV) infections, especially if you have active lung disease, history of smoking, and/or cardiac conditions.  The respiratory syncytial virus (RSV), is a common upper respiratory virus that causes severe inflammation in the airways, more than most upper respiratory viruses.   This vaccine is available at most pharmacies and clinics.  At this time, the RSV vaccine is a one time vaccine.    --If you are on Medicare insurance, you need to specifically get this vaccine from a pharmacist in a pharmacy for the best cost and to confirm coverage, it will be less expensive to get this there rather than from our clinic.       Nutrition:   Eat at least  "5 servings of fruits and vegetables each day.  Eat whole-grain bread, whole-wheat pasta and brown rice instead of white grains and rice.  Talk to your provider about Calcium and Vitamin D.        --Good Grains:  Oats, brown rice, Quinoa (these do not raise the blood sugar as much)     --Bad grains:  Anything made from wheat or white rice     (because these raise the blood sugars significantly, and the possible gluten issue from wheat for some people).      --Proteins:  Aim for \"lean proteins\" including chicken, fish, seafood, pork, turkey, and eggs (in moderation); Eat red meat only occasionally    Lifestyle  Exercise at least 150 minutes a week (30 minutes a day, 5 days a week). This will help you control your weight and prevent disease.  Limit alcohol to one drink per day.  No smoking.   Wear sunscreen to prevent skin cancer.   See your dentist every six months for an exam and cleaning.  See your eye doctor every 1 to 2 years.    "

## 2025-04-02 NOTE — PROGRESS NOTES
Preventive Care Visit  [facility]  Gilberto Clifford MD, Internal Medicine  Apr 2, 2025  {Provider  Link to [facility] :[id number]}    {PROVIDER CHARTING PREFERENCE:781490}    Myron Harris is a 65 year old, presenting for the following:  Medicare Visit        4/2/2025     2:19 PM   Additional Questions   Roomed by Ayleen PATRICK      {additonal problems for provider to add (Optional):747719}  Advance Care Planning  Patient does not have a Health Care Directive: {ADVANCE_DIRECTIVE_STATUS:104767}      4/1/2025   General Health   How would you rate your overall physical health? (!) FAIR   Feel stress (tense, anxious, or unable to sleep) To some extent   (!) STRESS CONCERN      4/1/2025   Nutrition   Diet: Regular (no restrictions)         4/1/2025   Exercise   Days per week of moderate/strenous exercise 3 days   Average minutes spent exercising at this level 20 min         4/1/2025   Social Factors   Frequency of gathering with friends or relatives Once a week   Worry food won't last until get money to buy more No   Food not last or not have enough money for food? No   Do you have housing? (Housing is defined as stable permanent housing and does not include staying ouside in a car, in a tent, in an abandoned building, in an overnight shelter, or couch-surfing.) Yes   Are you worried about losing your housing? No   Lack of transportation? No   Unable to get utilities (heat,electricity)? No         4/1/2025   Fall Risk   Fallen 2 or more times in the past year? No   Trouble with walking or balance? No          4/1/2025   Activities of Daily Living- Home Safety   Needs help with the following daily activites None of the above   Safety concerns in the home None of the above         4/1/2025   Dental   Dentist two times every year? Yes         4/1/2025   Hearing Screening   Hearing concerns? None of the above         4/1/2025   Driving Risk Screening   Patient/family members have  concerns about driving No         4/1/2025   General Alertness/Fatigue Screening   Have you been more tired than usual lately? (!) YES         4/1/2025   Urinary Incontinence Screening   Bothered by leaking urine in past 6 months No           3/6/2024   TB Screening   Were you born outside of the [place]? (!) YES  ***           Today's PHQ-2 Score:       4/1/2025     4:05 PM   PHQ-2 ( 1999 Pfizer)   Q1: Little interest or pleasure in doing things 0   Q2: Feeling down, depressed or hopeless 0   PHQ-2 Score 0    Q1: Little interest or pleasure in doing things Not at all   Q2: Feeling down, depressed or hopeless Not at all   PHQ-2 Score 0       Patient-reported           4/1/2025   Substance Use   Alcohol more than 3/day or more than 7/wk No   Do you have a current opioid prescription? No   How severe/bad is pain from 1 to 10? 5/10   Do you use any other substances recreationally? No     Social History     Tobacco Use    Smoking status: Never    Smokeless tobacco: Never   Substance Use Topics    Alcohol use: No    Drug use: No     {Provider  If there are gaps in the social history shown above, please follow the link to update and then refresh the note Link to Social and Substance History :812114}      12/13/2024   LAST FHS-7 RESULTS   1st degree relative breast or ovarian cancer No   Any relative bilateral breast cancer No   Any male have breast cancer No   Any ONE woman have BOTH breast AND ovarian cancer No   Any woman with breast cancer before 50yrs No   2 or more relatives with breast AND/OR ovarian cancer No   2 or more relatives with breast AND/OR bowel cancer No     {If any of the questions to the FHS7 are answered yes, consider referral for genetic counseling.    Additional indications for genetic referral include personal history of breast or ovarian cancer, genetic mutation in 1st degree relative which increases risk of breast cancer including BRCA1, BRCA2, ANIL, PALB 2, TP53, CHEK2, PTEN, CDH1, STK11 (per ACS)  and/or 1st degree relative with history of pancreatic or high-risk prostate cancer (per NCCN):734096}   {Mammogram Decision Support (Optional):580691}      History of abnormal Pap smear: { :456471}        Latest Ref Rng & Units 11/24/2015    10:07 AM 11/24/2015    12:00 AM 7/23/2012     9:33 AM   PAP / HPV   PAP (Historical)   NIL  NIL    HPV 16 DNA NEG Negative      HPV 18 DNA NEG Negative      Other HR HPV NEG Negative        ASCVD Risk   The 10-year ASCVD risk score (Lorenzo SAENZ, et al., 2019) is: 5.9%    Values used to calculate the score:      Age: 65 years      Sex: Female      Is Non- : No      Diabetic: No      Tobacco smoker: No      Systolic Blood Pressure: 142 mmHg      Is BP treated: No      HDL Cholesterol: 68 mg/dL      Total Cholesterol: 181 mg/dL    {Link to Fracture Risk Assessment Tool (Optional):917759}    {Provider  REQUIRED FOR AWV Use the storyboard to review patient history, after sections have been marked as reviewed, refresh note to capture documentation:980634}  {Provider   REQUIRED AWV use this link to review and update sexual activity history  after section has been marked as reviewed, refresh note to capture documentation:111120}  Reviewed and updated as needed this visit by Provider                    {HISTORY OPTIONS (Optional):917790}  Current providers sharing in care for this patient include:  Patient Care Team:  Gilberto Clifford MD as PCP - General  Gilberto Clifford MD as Assigned PCP    The following health maintenance items are reviewed in Epic and correct as of today:  Health Maintenance   Topic Date Due    ASTHMA ACTION PLAN  Never done    ALT  03/09/2025    BMP  03/09/2025    LIPID  03/09/2025    CBC  03/09/2025    ANNUAL REVIEW OF HM ORDERS  03/11/2025    COVID-19 Vaccine (8 - 2024-25 season) 03/16/2025    TSH W/FREE T4 REFLEX  03/09/2025    MEDICARE ANNUAL WELLNESS VISIT  03/11/2025    ASTHMA CONTROL TEST  06/09/2025     "ADVANCE CARE PLANNING  10/28/2025    MAMMO SCREENING  12/13/2025    FALL RISK ASSESSMENT  04/02/2026    Pneumococcal Vaccine: 50+ Years (3 of 3 - PCV20 or PCV21) 10/28/2026    DIABETES SCREENING  03/09/2027    DTAP/TDAP/TD IMMUNIZATION (3 - Td or Tdap) 10/28/2031    COLORECTAL CANCER SCREENING  12/20/2031    DEXA  11/04/2035    HEPATITIS C SCREENING  Completed    HIV SCREENING  Completed    PHQ-2 (once per calendar year)  Completed    INFLUENZA VACCINE  Completed    ZOSTER IMMUNIZATION  Completed    RSV VACCINE  Completed    HPV IMMUNIZATION  Aged Out    MENINGITIS IMMUNIZATION  Aged Out    PAP  Discontinued       {ROS Picklists (Optional):134933}     Objective    Exam  BP (!) 142/80   Pulse 96   Temp 97.2  F (36.2  C) (Temporal)   Resp 24   Ht 1.562 m (5' 1.5\")   Wt 56.2 kg (123 lb 14.4 oz)   LMP 10/11/2006 (Exact Date)   SpO2 100%   Breastfeeding No   BMI 23.03 kg/m     Estimated body mass index is 23.03 kg/m  as calculated from the following:    Height as of this encounter: 1.562 m (5' 1.5\").    Weight as of this encounter: 56.2 kg (123 lb 14.4 oz).    Physical Exam  {Exam Choices (Optional):786050}        4/2/2025   Mini Cog   Clock Draw Score 2 Normal   3 Item Recall 3 objects recalled   Mini Cog Total Score 5     {A Mini-Cog total score of 0-2 suggests the possibility of dementia, score of 3-5 suggests no dementia:222104}    Vision Screen  Vision Screen Results: (!) REFER  {Provider  Link to Vision and Hearing Results :831971}    Signed Electronically by: Gilberto Clifford MD  {Email feedback regarding this note to [email or web address]   :167579}  "    12:00 AM 7/23/2012     9:33 AM   PAP / HPV   PAP (Historical)   NIL  NIL    HPV 16 DNA NEG Negative      HPV 18 DNA NEG Negative      Other HR HPV NEG Negative        ASCVD Risk   The 10-year ASCVD risk score (Lorenzo SAENZ, et al., 2019) is: 5.3%    Values used to calculate the score:      Age: 65 years      Sex: Female      Is Non- : No      Diabetic: No      Tobacco smoker: No      Systolic Blood Pressure: 132 mmHg      Is BP treated: No      HDL Cholesterol: 67 mg/dL      Total Cholesterol: 190 mg/dL            Reviewed and updated as needed this visit by Provider     Meds     Fam Hx            **I reviewed the information recorded in the patient's EPIC chart (including but not limited to medical history, surgical history, family history, problem list, medication list, and allergy list) and updated the information as indicated based on the patients reported information.         Current providers sharing in care for this patient include:  Patient Care Team:  Gilberto Clifford MD as PCP - General  Gilberto Clifford MD as Assigned PCP    The following health maintenance items are reviewed in Epic and correct as of today:  Health Maintenance   Topic Date Due    ASTHMA ACTION PLAN  Never done    COVID-19 Vaccine (8 - 2024-25 season) 03/16/2025    ASTHMA CONTROL TEST  06/09/2025    ADVANCE CARE PLANNING  10/28/2025    MAMMO SCREENING  12/13/2025    MEDICARE ANNUAL WELLNESS VISIT  04/02/2026    ALT  04/02/2026    BMP  04/02/2026    LIPID  04/02/2026    TSH W/FREE T4 REFLEX  04/02/2026    ANNUAL REVIEW OF HM ORDERS  04/02/2026    FALL RISK ASSESSMENT  04/02/2026    CBC  04/02/2026    Pneumococcal Vaccine: 50+ Years (3 of 3 - PCV20 or PCV21) 10/28/2026    DIABETES SCREENING  04/02/2028    DTAP/TDAP/TD IMMUNIZATION (3 - Td or Tdap) 10/28/2031    COLORECTAL CANCER SCREENING  12/20/2031    DEXA  11/04/2035    HEPATITIS C SCREENING  Completed    HIV SCREENING  Completed     "PHQ-2 (once per calendar year)  Completed    INFLUENZA VACCINE  Completed    ZOSTER IMMUNIZATION  Completed    RSV VACCINE  Completed    HPV IMMUNIZATION  Aged Out    MENINGITIS IMMUNIZATION  Aged Out    PAP  Discontinued         Review of Systems  Constitutional, neuro, ENT, endocrine, pulmonary, cardiac, gastrointestinal, genitourinary, musculoskeletal, integument and psychiatric systems are negative, except as otherwise noted.     Objective    Exam  /64   Pulse 96   Temp 97.2  F (36.2  C) (Temporal)   Resp 24   Ht 1.562 m (5' 1.5\")   Wt 56.2 kg (123 lb 14.4 oz)   LMP 10/11/2006 (Exact Date)   SpO2 100%   Breastfeeding No   BMI 23.03 kg/m     Estimated body mass index is 23.03 kg/m  as calculated from the following:    Height as of this encounter: 1.562 m (5' 1.5\").    Weight as of this encounter: 56.2 kg (123 lb 14.4 oz).    Physical Exam  Physical Exam  Vitals and nursing note reviewed.   Constitutional:       Comments: Moves normally, alert, no apparent distress  HENT:      Head: Normocephalic and atraumatic.      Nose: Nose normal.   Eyes:      Extraocular Movements: Extraocular movements intact.   Cardiovascular:      Rate and Rhythm: Normal rate and regular rhythm.      Heart sounds: Normal heart sounds.   Pulmonary:      Effort: Pulmonary effort is normal.      Breath sounds: Normal breath sounds.   Abdominal:      General: There is no distension.      Palpations: There is no mass.      Tenderness: No abdominal tenderness, no distention.     Bowel sounds: normal  Musculoskeletal:         General: Normal range of motion, moves into the room normal, moves in and out of chair normally.    Skin:     General: Skin is warm and dry.   Neurological:      General: No focal deficit present.      Mental Status: Alert, responds to questions, Speech coherent  Psychiatric:         Mood and Affect: Mood normal.          4/2/2025   Mini Cog   Clock Draw Score 2 Normal   3 Item Recall 3 objects recalled "   Mini Cog Total Score 5         Vision Screen  Vision Screen Results: (!) REFER      Results for orders placed or performed in visit on 04/02/25   TSH with free T4 reflex     Status: Normal   Result Value Ref Range    TSH 2.86 0.30 - 4.20 uIU/mL   CBC with platelets     Status: Abnormal   Result Value Ref Range    WBC Count 7.5 4.0 - 11.0 10e3/uL    RBC Count 5.17 3.80 - 5.20 10e6/uL    Hemoglobin 12.8 11.7 - 15.7 g/dL    Hematocrit 38.1 35.0 - 47.0 %    MCV 74 (L) 78 - 100 fL    MCH 24.8 (L) 26.5 - 33.0 pg    MCHC 33.6 31.5 - 36.5 g/dL    RDW 12.9 10.0 - 15.0 %    Platelet Count 349 150 - 450 10e3/uL   Comprehensive metabolic panel     Status: None   Result Value Ref Range    Sodium 144 135 - 145 mmol/L    Potassium 4.4 3.4 - 5.3 mmol/L    Carbon Dioxide (CO2) 28 22 - 29 mmol/L    Anion Gap 11 7 - 15 mmol/L    Urea Nitrogen 13.7 8.0 - 23.0 mg/dL    Creatinine 0.94 0.51 - 0.95 mg/dL    GFR Estimate 67 >60 mL/min/1.73m2    Calcium 9.3 8.8 - 10.4 mg/dL    Chloride 105 98 - 107 mmol/L    Glucose 90 70 - 99 mg/dL    Alkaline Phosphatase 60 40 - 150 U/L    AST 34 0 - 45 U/L    ALT 45 0 - 50 U/L    Protein Total 7.3 6.4 - 8.3 g/dL    Albumin 4.3 3.5 - 5.2 g/dL    Bilirubin Total 0.2 <=1.2 mg/dL    Patient Fasting > 8hrs? No    Lipid panel reflex to direct LDL Fasting     Status: Abnormal   Result Value Ref Range    Cholesterol 190 <200 mg/dL    Triglycerides 167 (H) <150 mg/dL    Direct Measure HDL 67 >=50 mg/dL    LDL Cholesterol Calculated 90 <100 mg/dL    Non HDL Cholesterol 123 <130 mg/dL    Patient Fasting > 8hrs? No     Narrative    Cholesterol  Desirable: < 200 mg/dL  Borderline High: 200 - 239 mg/dL  High: >= 240 mg/dL    Triglycerides  Normal: < 150 mg/dL  Borderline High: 150 - 199 mg/dL  High: 200-499 mg/dL  Very High: >= 500 mg/dL    Direct Measure HDL  Female: >= 50 mg/dL   Male: >= 40 mg/dL    LDL Cholesterol  Desirable: < 100 mg/dL  Above Desirable: 100 - 129 mg/dL   Borderline High: 130 - 159 mg/dL    High:  160 - 189 mg/dL   Very High: >= 190 mg/dL    Non HDL Cholesterol  Desirable: < 130 mg/dL  Above Desirable: 130 - 159 mg/dL  Borderline High: 160 - 189 mg/dL  High: 190 - 219 mg/dL  Very High: >= 220 mg/dL   CRP inflammation     Status: Normal   Result Value Ref Range    CRP Inflammation <3.00 <5.00 mg/L   ESR: Erythrocyte sedimentation rate     Status: Normal   Result Value Ref Range    Erythrocyte Sedimentation Rate 15 0 - 30 mm/hr   Rheumatoid factor     Status: Normal   Result Value Ref Range    Rheumatoid Factor <10 <14 IU/mL   Rubeola Antibody IgG     Status: None   Result Value Ref Range    Rubeola (Measles) Chelsie IgG Instrument Value >300.0 <13.5 AU/mL    Rubeola (Measles) Antibody IgG Positive    Rubella Antibody IgG     Status: None   Result Value Ref Range    Rubella Chelsie IgG Instrument Value 9.69 <0.90 Index    Rubella Antibody IgG Positive           Signed Electronically by: Gilberto Clifford MD

## 2025-04-03 LAB
MEV IGG SER IA-ACNC: >300 AU/ML
MEV IGG SER IA-ACNC: POSITIVE
RUBV IGG SERPL QL IA: 9.69 INDEX
RUBV IGG SERPL QL IA: POSITIVE

## 2025-05-12 DIAGNOSIS — A60.04 HERPES SIMPLEX VULVOVAGINITIS: ICD-10-CM

## 2025-05-12 DIAGNOSIS — A60.00 RECURRENT GENITAL HSV (HERPES SIMPLEX VIRUS) INFECTION: ICD-10-CM

## 2025-05-13 RX ORDER — VALACYCLOVIR HYDROCHLORIDE 500 MG/1
500 TABLET, FILM COATED ORAL DAILY
Qty: 90 TABLET | Refills: 1 | Status: SHIPPED | OUTPATIENT
Start: 2025-05-13

## (undated) RX ORDER — FENTANYL CITRATE 50 UG/ML
INJECTION, SOLUTION INTRAMUSCULAR; INTRAVENOUS
Status: DISPENSED
Start: 2021-12-20